# Patient Record
Sex: MALE | Race: WHITE | Employment: OTHER | URBAN - METROPOLITAN AREA
[De-identification: names, ages, dates, MRNs, and addresses within clinical notes are randomized per-mention and may not be internally consistent; named-entity substitution may affect disease eponyms.]

---

## 2022-06-15 ENCOUNTER — OFFICE VISIT (OUTPATIENT)
Dept: NEUROLOGY | Age: 62
End: 2022-06-15
Payer: COMMERCIAL

## 2022-06-15 VITALS
WEIGHT: 226.8 LBS | HEART RATE: 68 BPM | BODY MASS INDEX: 35.52 KG/M2 | SYSTOLIC BLOOD PRESSURE: 145 MMHG | DIASTOLIC BLOOD PRESSURE: 91 MMHG

## 2022-06-15 DIAGNOSIS — G20 PARKINSON'S DISEASE (HCC): Primary | ICD-10-CM

## 2022-06-15 DIAGNOSIS — G20 DEMENTIA DUE TO PARKINSON'S DISEASE WITHOUT BEHAVIORAL DISTURBANCE (HCC): ICD-10-CM

## 2022-06-15 DIAGNOSIS — F02.80 DEMENTIA DUE TO PARKINSON'S DISEASE WITHOUT BEHAVIORAL DISTURBANCE (HCC): ICD-10-CM

## 2022-06-15 DIAGNOSIS — R26.9 GAIT DISTURBANCE: ICD-10-CM

## 2022-06-15 DIAGNOSIS — G20 PSYCHOSIS DUE TO PARKINSON'S DISEASE (HCC): ICD-10-CM

## 2022-06-15 DIAGNOSIS — F41.9 ANXIETY: ICD-10-CM

## 2022-06-15 PROCEDURE — 99205 OFFICE O/P NEW HI 60 MIN: CPT | Performed by: PSYCHIATRY & NEUROLOGY

## 2022-06-15 RX ORDER — SERTRALINE HYDROCHLORIDE 25 MG/1
25 TABLET, FILM COATED ORAL DAILY
Qty: 30 TABLET | Refills: 5 | Status: SHIPPED | OUTPATIENT
Start: 2022-06-15 | End: 2022-08-15 | Stop reason: SDUPTHER

## 2022-06-15 NOTE — PROGRESS NOTES
[facility] [facility]   [address], [address]  Phone: [phone] Fax [phone]  Kaylin Murphy MD      Patient: Jonelle Umana  Provider: Kaylin Murphy MD    CC:   Chief Complaint   Patient presents with    Follow-up     Parkinson's    Medication Refill     Referring Provider:    History of Present Illness:     Jonelle Umana is a 59 y.o. RH male who presents for follow-up of Parkinson's disease. He is accompanied for today's visit. Patient presents for follow-up and continued management of Parkinson's disease. He has previously been followed by Dr. Carolin Dawn over the last year and last seen March 2022. These notes have been reviewed. Chart review indicates patient was formally diagnosed in 2021 after approximately 2 years of symptoms including tremor of his L>R hand. Interestingly he has a family history of PD in his father (at the age of 50). Patient was subsequently started on levodopa and has slowly increased his dose over the last year to his current dose as noted below. He was subsequently referred to [address] for further evaluation and possible DBS candidacy. These notes have also been reviewed. Chart review indicates he saw Dr. Donalee Cranker for neuropsychological testing in May. Neuropsychiatric symptoms include cognitive decline, visual hallucinations, pseudobulbar affect, and RBD. Testing revealed marked impairment in multiple domains, most notably visual-spatial and executive function. Mild neurocognitive disorder was felt to be the most appropriate diagnosis at the time. However, clinical history with early onset of cognitive and psychiatric disturbances have raise concerns for other parkinsonian syndrome such as DLB.       It should also be noted that patient has a strong family history of Parkinson's disease most notably in his grandfather, father, and paternal aunt (though not formally diagnosed) with genetic testing and his daughter revealing a GBA mutation. Testing was reportedly done for further work-up of her son (patient's grandson) who has a mitochondrial disorder. 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 He has begun working reduced hours from 9 AM to 3 PM.  Illusions and visual hallucinations have been noted in addition to vivid dreams and behaviors at night resembling RBD. From a motor standpoint, he has done relatively well. Continues to take Sinemet  mg 2 tablets 4 times a day spaced approximately every 4 hours. OFF symptoms include a feeling of heaviness in his legs, gait disturbance including worsening shuffling, and tremors primarily of his left hand. He does not endorse anything resembling short duration responses or motor fluctuations throughout the day however. No dyskinesias. He does report starting physical therapy in [place] and also has participated in [employer] [place] boxing classes. He was recently trialed on donepezil but ended up discontinuing it due to adverse effects. Patient endorses a significant amount of anxiety to me which is his most pressing complaint today. Review of Systems:   Review of Systems   Constitutional: Negative for fever. HENT: Negative for congestion. Eyes: Negative for visual disturbance. Respiratory: Negative for cough. Cardiovascular: Negative for chest pain. Gastrointestinal: Negative for constipation. Genitourinary: Negative for dysuria. Musculoskeletal: Negative for gait problem. Skin: Negative for rash. Allergic/Immunologic: Negative for immunocompromised state. Neurological: Positive for tremors and weakness. Psychiatric/Behavioral: Positive for confusion (memory loss), decreased concentration, hallucinations and sleep disturbance. The patient is nervous/anxious.         Lab/Imaging Review:   I REVIEWED PERTINENT LABS, IMAGES, AND REPORTS WITH THE PATIENT PERSONALLY, DIRECTLY AND FULLY. THE MOST PERTINENT FINDINGS ARE NOTED BELOW:    DaTscan May 2022:  Impression: Abnormal exam, tracer significantly decreased at both striatum-most prominent posteriorly at the putamen, and also significantly decreased on the RIGHT side at the caudate head. This scan appearance supports a diagnosis of parkinsonian syndrome. Past Medical History:     Past medical history, surgical history, social history, family history, medications, and allergies were reviewed and updated as appropriate. PAST MEDICAL HISTORY:  Past Medical History:   Diagnosis Date    Arthritis     Mild neurocognitive disorder     Parkinson's disease ([facility]      PAST SURGICAL HISTORY:   Past Surgical History:   Procedure Laterality Date    VASECTOMY      WISDOM TOOTH EXTRACTION       FAMILY HISTORY:  Family History   Problem Relation Age of Onset    Parkinson's Disease Father     Parkinson's Disease Paternal Grandfather       SOCIAL HISTORY:  Social History     Socioeconomic History    Marital status:      Spouse name: None    Number of children: None    Years of education: None    Highest education level: None   Occupational History    None   Tobacco Use    Smoking status: Never Smoker    Smokeless tobacco: Never Used   Substance and Sexual Activity    Alcohol use: None    Drug use: None    Sexual activity: None   Other Topics Concern    None   Social History Narrative    None     Social Determinants of Health     Financial Resource Strain:     Difficulty of Paying Living Expenses: Not on file   Food Insecurity:     Worried About Running Out of Food in the Last Year: Not on file    Keke of Food in the Last Year: Not on file   Transportation Needs:     Lack of Transportation (Medical): Not on file    Lack of Transportation (Non-Medical):  Not on file   Physical Activity:     Days of Exercise per Week: Not on file    Minutes of Exercise per Session: Not on file   Stress:     Feeling of Stress : Not on file   Social Connections:     Frequency of Communication with Friends and Family: Not on file    Frequency of Social Gatherings with Friends and Family: Not on file    Attends [employer] Services: Not on file    Active Member of Clubs or Organizations: Not on file    Attends Club or Organization Meetings: Not on file    Marital Status: Not on file   Intimate Partner Violence:     Fear of Current or Ex-Partner: Not on file    Emotionally Abused: Not on file    Physically Abused: Not on file    Sexually Abused: Not on file   Housing Stability:     Unable to Pay for Housing in the Last Year: Not on file    Number of [place] in the Last Year: Not on file    Unstable Housing in the Last Year: Not on file       Medications/Allergies:     MEDICATIONS:   Outpatient Encounter Medications as of 6/15/2022   Medication Sig Dispense Refill    sertraline (ZOLOFT) 25 MG tablet Take 1 tablet by mouth daily 30 tablet 5    aspirin 81 MG EC tablet Take 81 mg by mouth daily      carbidopa-levodopa (SINEMET)  MG per tablet Take 2 tablets by mouth 4 times daily       meloxicam (MOBIC) 7.5 MG tablet Take 7.5 mg by mouth daily      [DISCONTINUED] Istradefylline (NOURIANZ) 20 MG TABS Take 20 mg by mouth daily (Patient not taking: Reported on 6/15/2022)       No facility-administered encounter medications on file as of 6/15/2022. ALLERGIES:  No Known Allergies      Physical Exam:     BP (!) 145/91 (Site: Right Upper Arm, Position: Sitting)   Pulse 68   Wt 226 lb 12.8 oz (102.9 kg)   BMI 35.52 kg/m²     General Exam:  General: Well developed, well nourished, in no apparent distress. HEENT: Normocephalic, atraumatic. Sclera anicteric. Oropharynx clear. Neck: Supple without masses  Cardiovascular: Regular rate and rhythm. No carotid bruits. Lungs: Non-labored breathing. Abdomen: Soft, nontender, nondistended. Extremities: Peripheral pulses intact.  No edema and no rashes. Neurological Exam:     MS/Language/Speech:  Alert. Oriented to person, place, and time. Language fluent. Mild hypophonia. Cranial Nerves: PERRL. Eye movements full. No nystagmus. There is diminished facial expression. Tongue and palate were midline. Shoulder shrug with slight delay on the left. Motor: Strength was full in all proximal and distal muscle groups. There is mild to moderate rigidity in the left upper and left lower extremity. Only slight rigidity in the right upper and right lower extremity. Abnormal Movements: Intermittent L>R resting tremor noted with only a slight kinetic component. Mild to moderate bradykinesia with upper extremity movements, slightly worse on the left in some respects. Foot tapping is mildly slowed bilaterally with no clear asymmetry. Sensory: Normal to light touch, pinprick, and vibration throughout. Cerebellar: No ataxia or dysmetria. Reflexes (R/L): Biceps (2+/2+), Brachioradialis (2+/2+), Patellar (2+/2+). Plantar responses were flexor. Gait: He can rise from a seated position without assistance. Posture is mildly stooped. Gait is narrow based with reduced arm swing bilaterally. Stride is mildly reduced bilaterally. Turns are relatively fluid. No freezing noted. Postural reflexes are relatively intact. Assessment and Plan:     Jacque John is a 59 y.o. male who presents with the following issues:     Bao Temple was seen today for follow-up and medication refill. Diagnoses and all orders for this visit:    Parkinson's disease ([address]    Dementia due to Parkinson's disease without behavioral disturbance ([address]    Psychosis due to Parkinson's disease ([address]    Anxiety  -     sertraline (ZOLOFT) 25 MG tablet; Take 1 tablet by mouth daily    Gait disturbance      In summary, this gentleman has clear features of parkinsonism on exam with an abnormal DaTscan consistent with a primary parkinsonian syndrome.   His presentation could certainly be consistent with PD though with the presence of early cognitive dysfunction and neuropsychiatric symptoms, this could raise concern for DLB. Motor symptoms appear to be manageable though he does have some tremor and mild gait dysfunction. It is never been very clear if he has had a beneficial response to levodopa. I spent an extensive amount of time reviewing records and discussing his symptoms with him today. With respect to his DBS candidacy, I do not feel strongly that he would benefit from DBS surgery. He was very clear that the primary source of his disruption in day-to-day life are his cognitive dysfunction and anxiety and I did discuss that DBS is not likely to help with these issues (and in the case of cognitive dysfunction can sometimes worsen). As noted above, the symptom complex would raise concern for the possibility of DLB. Genetic testing for GBA mutations is apparently pending. Trials of donepezil were poorly tolerated. We ultimately settled on a trial of sertraline 25 mg daily as he notes anxiety was his primary complaint today. Dosage increases could be considered as needed and tolerated. But in the future we could consider other cholinesterase inhibitor such as an Exelon patch to avoid GI adverse effects. I also recommended a trial of melatonin for RBD which has been problematic at night and causing significant sleep disruption. He was counseled on the benefits of aerobic exercise with respect to delaying disease progression and I encouraged his participation in physical therapy which is already been set up for him. Follow-up in 2 months.       Signature: Dafne Rangel MD      Date:  6/21/2022    [facility]   [facility] [address] [place] [address]  Ph: [phone]  Fax: [phone]         I have personally interviewed and examined Mr. Angela Wells and I have personally reviewed all relevant records including labs and imaging as noted above. I have written all aspects of this note. More than 50% of this time was used for counseling regarding my diagnosis, prognosis, and plans for management. Total visit time: 70 minutes.

## 2022-06-21 ASSESSMENT — ENCOUNTER SYMPTOMS
CONSTIPATION: 0
COUGH: 0

## 2022-08-08 DIAGNOSIS — G20 PARKINSON'S DISEASE (HCC): Primary | ICD-10-CM

## 2022-08-15 ENCOUNTER — OFFICE VISIT (OUTPATIENT)
Dept: NEUROLOGY | Age: 62
End: 2022-08-15
Payer: COMMERCIAL

## 2022-08-15 VITALS
HEIGHT: 67 IN | BODY MASS INDEX: 34.84 KG/M2 | WEIGHT: 222 LBS | SYSTOLIC BLOOD PRESSURE: 150 MMHG | HEART RATE: 66 BPM | DIASTOLIC BLOOD PRESSURE: 86 MMHG

## 2022-08-15 DIAGNOSIS — G20 PARKINSON'S DISEASE (HCC): Primary | ICD-10-CM

## 2022-08-15 DIAGNOSIS — F02.80 DEMENTIA DUE TO PARKINSON'S DISEASE WITHOUT BEHAVIORAL DISTURBANCE (HCC): ICD-10-CM

## 2022-08-15 DIAGNOSIS — G20 DEMENTIA DUE TO PARKINSON'S DISEASE WITHOUT BEHAVIORAL DISTURBANCE (HCC): ICD-10-CM

## 2022-08-15 DIAGNOSIS — F41.9 ANXIETY: ICD-10-CM

## 2022-08-15 DIAGNOSIS — G47.52 RBD (REM BEHAVIORAL DISORDER): ICD-10-CM

## 2022-08-15 DIAGNOSIS — G20 PSYCHOSIS DUE TO PARKINSON'S DISEASE (HCC): ICD-10-CM

## 2022-08-15 DIAGNOSIS — R26.89 ABNORMALITY OF GAIT DUE TO IMPAIRMENT OF BALANCE: ICD-10-CM

## 2022-08-15 DIAGNOSIS — G89.29 OTHER CHRONIC PAIN: ICD-10-CM

## 2022-08-15 PROCEDURE — 99215 OFFICE O/P EST HI 40 MIN: CPT | Performed by: PSYCHIATRY & NEUROLOGY

## 2022-08-15 RX ORDER — RIVASTIGMINE 4.6 MG/24H
1 PATCH, EXTENDED RELEASE TRANSDERMAL DAILY
Qty: 30 PATCH | Refills: 5 | Status: SHIPPED | OUTPATIENT
Start: 2022-08-15 | End: 2022-10-25 | Stop reason: ALTCHOICE

## 2022-08-15 RX ORDER — SERTRALINE HYDROCHLORIDE 25 MG/1
25 TABLET, FILM COATED ORAL DAILY
Qty: 90 TABLET | Refills: 3 | Status: SHIPPED | OUTPATIENT
Start: 2022-08-15 | End: 2022-10-25 | Stop reason: SDUPTHER

## 2022-08-15 RX ORDER — MELOXICAM 7.5 MG/1
7.5 TABLET ORAL DAILY
Qty: 30 TABLET | Refills: 11 | Status: SHIPPED | OUTPATIENT
Start: 2022-08-15

## 2022-08-15 RX ORDER — CARBIDOPA/LEVODOPA 25MG-250MG
1 TABLET ORAL 4 TIMES DAILY
Qty: 120 TABLET | Refills: 5 | Status: SHIPPED | OUTPATIENT
Start: 2022-08-15

## 2022-08-15 ASSESSMENT — ENCOUNTER SYMPTOMS
CONSTIPATION: 0
COUGH: 0

## 2022-08-15 NOTE — PROGRESS NOTES
[address]  [address], [address] [facility], [address] [place] [address]  Phone: [phone] Fax [phone]  Pamela Lee MD      Patient: Estefania Osman  Provider: Pamela Lee MD    CC:   Chief Complaint   Patient presents with    Neurologic Problem     2 month follow up Parkinson's     Referring Provider:    History of Present Illness:     Estefania Osman is a 59 y.o. RH male who presents for follow-up of Parkinson's disease. He is accompanied for today's visit. He was last seen June 2022. Patient presents for follow-up and continued management of Parkinson's disease. He has previously been followed by Dr. Patricia Arboleda over the last year and last seen March 2022. These notes have been reviewed. To summarize, he was formally diagnosed with PD in 2021 after approximately 2 years of symptoms including tremor of his L>R hand. Interestingly he has a family history of PD in his father (at the age of 50 years). He was subsequently started on levodopa with a gradual increase in dose over the last year. He was also referred to [address] for further evaluation possible DBS candidacy though he has not been felt to be an adequate DBS candidate based off the presence of early cognitive decline and neuropsychiatric symptoms. Patient be noted that patient has a strong family history of Parkinson's disease with genetic testing revealing a GBA mutation (unable to review actual results). Current medications include:  Sinemet  mg 2 tablets 4 times a day approximately every 4 hours  Sertraline 25 mg daily    Previous medication trials include: Donepezil (adverse effects), Rytary (high cost)      Patient presents for follow-up. He has continued some follow-up with [address] and it appears he is not felt to be an adequate candidate for DBS.   Much of the issue may involve relatively early presence of cognitive decline and visual hallucinations that have led to concern for other possible parkinsonian syndrome such as DLB.    It does appear that he gets benefit from levodopa and he continues to take it 4 times a day as noted above. 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 He has begun working reduced hours from 9 AM to 3 PM.  He also notes a general heaviness of his body and legs and becomes greatly fatigued later in the day. It is not clear if this is a levodopa responsive symptom or not as he does not endorse anything resembling early wearing off or motor fluctuations. No dyskinesias. Delusions and visual hallucinations have been noted but appear to be relatively stable. He does have vivid dreams and behaviors at night resembling RBD though fortunately these are not causing significant disruptions in sleep. Previous trials of donepezil were poorly tolerated. The addition of sertraline has been helpful for anxiety. He does report starting physical therapy in [place] and also has participated in [place] boxing classes. Review of Systems:   Review of Systems   Constitutional:  Negative for fever. HENT:  Negative for congestion. Eyes:  Negative for visual disturbance. Respiratory:  Negative for cough. Cardiovascular:  Negative for chest pain. Gastrointestinal:  Negative for constipation. Genitourinary:  Negative for dysuria. Musculoskeletal:  Negative for gait problem. Skin:  Negative for rash. Allergic/Immunologic: Negative for immunocompromised state. Neurological:  Positive for tremors and weakness. Psychiatric/Behavioral:  Positive for confusion (memory loss), decreased concentration, hallucinations and sleep disturbance. The patient is nervous/anxious. Lab/Imaging Review:   I REVIEWED PERTINENT LABS, IMAGES, AND REPORTS WITH THE PATIENT PERSONALLY, DIRECTLY AND FULLY.  THE MOST PERTINENT FINDINGS ARE NOTED BELOW:    DaTscan May 2022:  Impression: Abnormal exam, tracer significantly decreased at both striatum-most prominent posteriorly at the putamen, and also significantly decreased on the RIGHT side at the caudate head. This scan appearance supports a diagnosis of parkinsonian syndrome. Past Medical History:     Past medical history, surgical history, social history, family history, medications, and allergies were reviewed and updated as appropriate. PAST MEDICAL HISTORY:  Past Medical History:   Diagnosis Date    Arthritis     Mild neurocognitive disorder     Parkinson's disease ([address]      PAST SURGICAL HISTORY:   Past Surgical History:   Procedure Laterality Date    VASECTOMY      WISDOM TOOTH EXTRACTION       FAMILY HISTORY:  Family History   Problem Relation Age of Onset    Parkinson's Disease Father     Parkinson's Disease Paternal Grandfather       SOCIAL HISTORY:  Social History     Socioeconomic History    Marital status:      Spouse name: None    Number of children: None    Years of education: None    Highest education level: None   Tobacco Use    Smoking status: Never    Smokeless tobacco: Never       Medications/Allergies:     MEDICATIONS:   Outpatient Encounter Medications as of 8/15/2022   Medication Sig Dispense Refill    meloxicam (MOBIC) 7.5 MG tablet Take 1 tablet by mouth in the morning. 30 tablet 11    sertraline (ZOLOFT) 25 MG tablet Take 1 tablet by mouth in the morning. 90 tablet 3    rivastigmine (EXELON) 4.6 MG/24HR Place 1 patch onto the skin in the morning. 30 patch 5    carbidopa-levodopa (SINEMET)  MG per tablet Take 1 tablet by mouth in the morning and 1 tablet at noon and 1 tablet in the evening and 1 tablet before bedtime. 120 tablet 5    aspirin 81 MG EC tablet Take 81 mg by mouth daily      [DISCONTINUED] carbidopa-levodopa (SINEMET)  MG per tablet Take 2 tablets by mouth in the morning and 2 tablets at noon and 2 tablets in the evening and 2 tablets before bedtime.  720 tablet 1    [DISCONTINUED] sertraline (ZOLOFT) 25 MG tablet Take 1 tablet by mouth daily 30 tablet 5    [DISCONTINUED] meloxicam (MOBIC) 7.5 MG tablet Take 7.5 mg by mouth daily       No facility-administered encounter medications on file as of 8/15/2022. ALLERGIES:  No Known Allergies      Physical Exam:     BP (!) 150/86   Pulse 66   Ht 5' 7\" (1.702 m)   Wt 222 lb (100.7 kg)   BMI 34.77 kg/m²     General Exam:  General: Well developed, well nourished, in no apparent distress. HEENT: Normocephalic, atraumatic. Sclera anicteric. Oropharynx clear. Neck: Supple without masses  Cardiovascular: Regular rate and rhythm. No carotid bruits. Lungs: Non-labored breathing. Abdomen: Soft, nontender, nondistended. Extremities: Peripheral pulses intact. No edema and no rashes. Neurological Exam:     MS/Language/Speech:  Alert. Oriented to person, place, and time. Language fluent. Mild hypophonia. Cranial Nerves: PERRL. Eye movements full. No nystagmus. There is diminished facial expression. Tongue and palate were midline. Shoulder shrug with slight delay on the left. Motor: Strength was full in all proximal and distal muscle groups. There is moderate rigidity in the left upper and left lower extremity. Mild rigidity in the right upper and right lower extremity. Abnormal Movements: Intermittent L>R resting tremor noted with only a slight kinetic component. Mild to moderate bradykinesia with upper extremity movements, slightly worse on the left in some respects. Foot tapping is mildly slowed bilaterally with no clear asymmetry. Sensory: Normal to light touch, pinprick, and vibration throughout. Cerebellar: No ataxia or dysmetria. Reflexes (R/L): Biceps (2+/2+), Brachioradialis (2+/2+), Patellar (2+/2+). Plantar responses were flexor. Gait: He can rise from a seated position without assistance. Posture is mildly stooped. Gait is narrow based with reduced arm swing bilaterally.  Stride is mildly reduced bilaterally. Turns are relatively fluid. No freezing noted. Postural reflexes are relatively intact. Assessment and Plan:     Aaron Christie is a 59 y.o. male who presents with the following issues:     Will Vazquez was seen today for neurologic problem. Diagnoses and all orders for this visit:    Park[facility]'s disease ([facility]  -     carbidopa-levodopa (SINEMET)  MG per tablet; Take 1 tablet by mouth in the morning and 1 tablet at noon and 1 tablet in the evening and 1 tablet before bedtime. Dementia due to Parkinson's disease without behavioral disturbance (HCC)  -     rivastigmine (EXELON) 4.6 MG/24HR; Place 1 patch onto the skin in the morning. [facility]    Anxiety  -     sertraline (ZOLOFT) 25 MG tablet; Take 1 tablet by mouth in the morning. Other chronic pain  -     meloxicam (MOBIC) 7.5 MG tablet; Take 1 tablet by mouth in the morning. RBD (REM behavioral disorder)    Abnormality of gait due to impairment of balance      Patient presents for follow-up and continued management of Parkinson's disease complicated by PD-MCI/mild dementia in addition to early neuropsychiatric symptoms including visual hallucinations. This has led to concern for the possibility of DLB, though I have also been informed that genetic testing is also revealed possible GBA mutation (though unable to review results) that may be underlying a strong family history of PD. His primary complaint today appears to be worsening motor deficits including a feeling of fatigue and heaviness in his body and lower extremities that appears to worsen throughout the day. It is not entirely clear if this is a wearing off symptom but I have suggested that we can increase his levodopa to a Sinemet  mg tablet to be taken 1 tablet 4 times a day.     We have spent some time reviewing records and discussing symptoms with him with respect to his DBS candidacy and unfortunately it is felt that he has not an adequate candidate. Cognitive impairment continues to be an issue and we will try an Exelon patch 4.6 mg per 24 hours in an attempt to avoid the GI adverse effects that came up with donepezil. He will continue sertraline 25 mg daily which has been helpful for anxiety and dosage increases could be considered in the future if needed. We will continue to monitor for any worsening psychotic symptoms. I also recommended a trial of melatonin for RBD which has been problematic at night especially if they are causing more significant sleep disruption. He was counseled on the benefits of aerobic exercise with respect to delaying disease progression and I encouraged his participation in physical therapy which is already been set up for him. Follow-up in 4 months. Signature: Gia Dougherty MD      Date:  8/21/2022    [facility]   [place] [address], [address]  Ph: [phone]  Fax: [phone]         I have personally interviewed and examined Mr. Blayne Perez and I have personally reviewed all relevant records including labs and imaging as noted above. I have written all aspects of this note. More than 50% of this time was used for counseling regarding my diagnosis, prognosis, and plans for management. Total visit time: 46 minutes.

## 2022-10-25 ENCOUNTER — TELEPHONE (OUTPATIENT)
Dept: NEUROLOGY | Age: 62
End: 2022-10-25

## 2022-10-25 DIAGNOSIS — F41.9 ANXIETY: ICD-10-CM

## 2022-10-25 DIAGNOSIS — F02.80 DEMENTIA DUE TO PARKINSON'S DISEASE WITHOUT BEHAVIORAL DISTURBANCE (HCC): ICD-10-CM

## 2022-10-25 DIAGNOSIS — G20 DEMENTIA DUE TO PARKINSON'S DISEASE WITHOUT BEHAVIORAL DISTURBANCE (HCC): ICD-10-CM

## 2022-10-25 RX ORDER — RIVASTIGMINE 9.5 MG/24H
1 PATCH, EXTENDED RELEASE TRANSDERMAL DAILY
Qty: 30 PATCH | Refills: 5 | Status: SHIPPED | OUTPATIENT
Start: 2022-10-25

## 2022-10-25 NOTE — TELEPHONE ENCOUNTER
Spoke to spouse. We are going to increase sertraline to 50 mg daily. Increase Exelon to 9.5 mg / 24-hour patch.

## 2022-10-25 NOTE — TELEPHONE ENCOUNTER
Pt's wife called and stated she feels the exelon and zoloft may need to be increased. The dose that was started was working, but pt has become on edge again with his anxiety. His memory issues seem to be getting a little worse also.

## 2022-12-30 ENCOUNTER — OFFICE VISIT (OUTPATIENT)
Dept: NEUROLOGY | Age: 62
End: 2022-12-30
Payer: COMMERCIAL

## 2022-12-30 VITALS
OXYGEN SATURATION: 97 % | DIASTOLIC BLOOD PRESSURE: 82 MMHG | HEART RATE: 78 BPM | SYSTOLIC BLOOD PRESSURE: 120 MMHG | WEIGHT: 214 LBS | BODY MASS INDEX: 33.52 KG/M2

## 2022-12-30 DIAGNOSIS — F41.9 ANXIETY: ICD-10-CM

## 2022-12-30 DIAGNOSIS — F02.80 DEMENTIA DUE TO PARKINSON'S DISEASE WITHOUT BEHAVIORAL DISTURBANCE (HCC): ICD-10-CM

## 2022-12-30 DIAGNOSIS — G47.52 RBD (REM BEHAVIORAL DISORDER): ICD-10-CM

## 2022-12-30 DIAGNOSIS — R26.89 ABNORMALITY OF GAIT DUE TO IMPAIRMENT OF BALANCE: ICD-10-CM

## 2022-12-30 DIAGNOSIS — G20 MODERATE DEMENTIA DUE TO PARKINSON'S DISEASE, WITHOUT BEHAVIORAL DISTURBANCE, PSYCHOTIC DISTURBANCE, MOOD DISTURBANCE, OR ANXIETY (HCC): ICD-10-CM

## 2022-12-30 DIAGNOSIS — F02.B0 MODERATE DEMENTIA DUE TO PARKINSON'S DISEASE, WITHOUT BEHAVIORAL DISTURBANCE, PSYCHOTIC DISTURBANCE, MOOD DISTURBANCE, OR ANXIETY (HCC): ICD-10-CM

## 2022-12-30 DIAGNOSIS — G89.29 OTHER CHRONIC PAIN: ICD-10-CM

## 2022-12-30 DIAGNOSIS — G20 PSYCHOSIS DUE TO PARKINSON'S DISEASE (HCC): ICD-10-CM

## 2022-12-30 DIAGNOSIS — G20 DEMENTIA DUE TO PARKINSON'S DISEASE WITHOUT BEHAVIORAL DISTURBANCE (HCC): ICD-10-CM

## 2022-12-30 DIAGNOSIS — G20 PARKINSON'S DISEASE (HCC): Primary | ICD-10-CM

## 2022-12-30 PROCEDURE — 99215 OFFICE O/P EST HI 40 MIN: CPT | Performed by: PSYCHIATRY & NEUROLOGY

## 2022-12-30 RX ORDER — MEMANTINE HYDROCHLORIDE 5 MG/1
5 TABLET ORAL 2 TIMES DAILY
Qty: 60 TABLET | Refills: 5 | Status: SHIPPED | OUTPATIENT
Start: 2022-12-30

## 2022-12-30 RX ORDER — SERTRALINE HYDROCHLORIDE 25 MG/1
25 TABLET, FILM COATED ORAL DAILY
Qty: 90 TABLET | Refills: 3 | Status: SHIPPED | OUTPATIENT
Start: 2022-12-30

## 2022-12-30 RX ORDER — MELOXICAM 15 MG/1
15 TABLET ORAL DAILY
Qty: 30 TABLET | Refills: 5 | Status: SHIPPED | OUTPATIENT
Start: 2022-12-30

## 2022-12-30 ASSESSMENT — ENCOUNTER SYMPTOMS
CONSTIPATION: 0
COUGH: 0

## 2022-12-30 NOTE — PROGRESS NOTES
Malachi Knutson  [address], [address] [facility], [address] [place] [address]  Phone: [phone] Fax [phone]  Deirdre Monahan MD      Patient: Nazia Bond  Provider: Deirdre Monahan MD    CC:   Chief Complaint   Patient presents with    Follow-up     Parkinson's Disease     Referring Provider:    History of Present Illness:     Nazia Bond is a 57 y.o. RH male who presents for follow-up of Parkinson's disease. He is accompanied for today's visit. He was last seen June 2022. Patient presents for follow-up and continued management of Parkinson's disease. He has previously been followed by Dr. Ele Tavera over the last year and last seen March 2022. These notes have been reviewed. To summarize, he was formally diagnosed with PD in 2021 after approximately 2 years of symptoms including tremor of his L>R hand. Interestingly he has a family history of PD in his father (at the age of 50 years). He was subsequently started on levodopa with a gradual increase in dose over the last year. He was also referred to [address] for further evaluation possible DBS candidacy though he has not been felt to be an adequate DBS candidate based off the presence of early cognitive decline and neuropsychiatric symptoms. Patient be noted that patient has a strong family history of Parkinson's disease with genetic testing revealing a GBA mutation (unable to review actual results). Current medications include:  Sinemet  mg 1 tablets 4 times a day approximately every 4 hours  Sertraline 25 mg daily (reports adverse effects with 50 mg)    Previous medication trials include: Donepezil (adverse effects), Rytary (high cost)    Patient presents for follow-up. Continues to take Sinemet 4 times a day as noted above. We also had increased his sertraline but he reduced the dose back down to 25 mg daily due to reported adverse effects.   It is not clear if the side effects were from that were from the Exelon patch that was attempted. He has had some difficulty with cholinesterase inhibitors in general with respect adverse effects. 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 He has difficulty remembering how to do certain tasks, for example when working on cars, which he had previously been doing for decades. His spouse is also at work with him and helps to  work. He has more difficulty with arithmetic. He still notes a general heaviness of his body and legs and becomes greatly fatigued later in the day. It is not clear if this is a levodopa responsive symptom or not as he does not endorse anything resembling early wearing off or motor fluctuations. No dyskinesias. Delusions and visual hallucinations have been noted but appear to be relatively stable. He does have vivid dreams and behaviors at night resembling RBD though fortunately these are not causing significant disruptions in sleep. He does take over-the-counter melatonin which appears to have been helpful. Previous trials of donepezil and now Exelon patch were poorly tolerated. The addition of sertraline has been helpful for anxiety but he did not like the increased dose. There have been some slight dyskinetic movements in the lower extremity which have not been particularly noticeable or bothersome. He had started physical therapy in [place] and is interested in a refresher. He has also done boxing classes. Review of Systems:   Review of Systems   Constitutional:  Negative for fever. HENT:  Negative for congestion. Eyes:  Negative for visual disturbance. Respiratory:  Negative for cough. Cardiovascular:  Negative for chest pain. Gastrointestinal:  Negative for constipation. Genitourinary:  Negative for dysuria. Musculoskeletal:  Negative for gait problem.    Skin: Negative for rash. Allergic/Immunologic: Negative for immunocompromised state. Neurological:  Positive for tremors and weakness. Psychiatric/Behavioral:  Positive for confusion (memory loss), decreased concentration, hallucinations and sleep disturbance. The patient is nervous/anxious. Lab/Imaging Review:   I REVIEWED PERTINENT LABS, IMAGES, AND REPORTS WITH THE PATIENT PERSONALLY, DIRECTLY AND FULLY. THE MOST PERTINENT FINDINGS ARE NOTED BELOW:    DaTscan May 2022:  Impression: Abnormal exam, tracer significantly decreased at both striatum-most prominent posteriorly at the putamen, and also significantly decreased on the RIGHT side at the caudate head. This scan appearance supports a diagnosis of parkinsonian syndrome. Past Medical History:     Past medical history, surgical history, social history, family history, medications, and allergies were reviewed and updated as appropriate.      PAST MEDICAL HISTORY:  Past Medical History:   Diagnosis Date    Arthritis     Mild neurocognitive disorder     Parkinson's disease ([facility] [address]      PAST SURGICAL HISTORY:   Past Surgical History:   Procedure Laterality Date    VASECTOMY      WISDOM TOOTH EXTRACTION       FAMILY HISTORY:  Family History   Problem Relation Age of Onset    Parkinson's Disease Father     Parkinson's Disease Paternal Grandfather       SOCIAL HISTORY:  Social History     Socioeconomic History    Marital status:      Spouse name: None    Number of children: None    Years of education: None    Highest education level: None   Tobacco Use    Smoking status: Never    Smokeless tobacco: Never       Medications/Allergies:     MEDICATIONS:   Outpatient Encounter Medications as of 12/30/2022   Medication Sig Dispense Refill    memantine (NAMENDA) 5 MG tablet Take 1 tablet by mouth 2 times daily 60 tablet 5    meloxicam (MOBIC) 15 MG tablet Take 1 tablet by mouth daily 30 tablet 5    sertraline (ZOLOFT) 25 MG tablet Take 1 tablet by mouth daily 90 tablet 3    carbidopa-levodopa (SINEMET)  MG per tablet Take 1 tablet by mouth in the morning and 1 tablet at noon and 1 tablet in the evening and 1 tablet before bedtime. 120 tablet 5    aspirin 81 MG EC tablet Take 81 mg by mouth daily      [DISCONTINUED] rivastigmine (EXELON) 9.5 MG/24HR Place 1 patch onto the skin daily (Patient not taking: Reported on 12/30/2022) 30 patch 5    [DISCONTINUED] sertraline (ZOLOFT) 50 MG tablet Take 1 tablet by mouth daily 30 tablet 5    [DISCONTINUED] meloxicam (MOBIC) 7.5 MG tablet Take 1 tablet by mouth in the morning. 30 tablet 11     No facility-administered encounter medications on file as of 12/30/2022. ALLERGIES:  No Known Allergies      Physical Exam:     /82   Pulse 78   Wt 214 lb (97.1 kg)   SpO2 97%   BMI 33.52 kg/m²     General Exam:  General: Well developed, well nourished, in no apparent distress. HEENT: Normocephalic, atraumatic. Sclera anicteric. Oropharynx clear. Neck: Supple without masses  Cardiovascular: Regular rate and rhythm. No carotid bruits. Lungs: Non-labored breathing. Abdomen: Soft, nontender, nondistended. Extremities: Peripheral pulses intact. No edema and no rashes. Neurological Exam:     MS/Language/Speech:  Alert. Oriented to person, place, and time. He had difficulty drawing a clock with numbers and hands in the appropriate positions language fluent. He was not able to state the months backwards accurately. Mild hypophonia. Cranial Nerves: PERRL. Eye movements full. No nystagmus. There is diminished facial expression. Tongue and palate were midline. Shoulder shrug with slight delay on the left. Motor: Strength was full in all proximal and distal muscle groups. There is moderate rigidity in the left upper and left lower extremity. Mild rigidity in the right upper and right lower extremity. Abnormal Movements: Intermittent L>R resting tremor noted with only a slight kinetic component.   Mild to moderate bradykinesia with upper extremity movements, slightly worse on the left in some respects. Foot tapping is mildly slowed bilaterally with no clear asymmetry. Sensory: Normal to light touch, pinprick, and vibration throughout. Cerebellar: No ataxia or dysmetria. Reflexes (R/L): Biceps (2+/2+), Brachioradialis (2+/2+), Patellar (2+/2+). Plantar responses were flexor. Gait: He can rise from a seated position without assistance. Posture is mildly stooped. Gait is narrow based with reduced arm swing bilaterally. Stride is mildly reduced bilaterally. Turns are relatively fluid. No freezing noted. Postural reflexes are relatively intact. Assessment and Plan:     Kylie Navarrete is a 57 y.o. male who presents with the following issues:     Nayana Oneill was seen today for follow-up. Diagnoses and all orders for this visit:    Parkinson's disease ([facility] [address]  -     External Referral to Physical Therapy    Moderate dementia due to Parkinson's disease, without behavioral disturbance, psychotic disturbance, mood disturbance, or anxiety ([facility])  -     memantine (NAMENDA) 5 MG tablet; Take 1 tablet by mouth 2 times daily    Abnormality of gait due to impairment of balance  -     External Referral to Physical Therapy    Other chronic pain  -     meloxicam (MOBIC) 15 MG tablet; Take 1 tablet by mouth daily    Anxiety  -     sertraline (ZOLOFT) 25 MG tablet; Take 1 tablet by mouth daily    Dementia due to Parkinson's disease without behavioral disturbance ([facility] [address]    Psychosis due to Parkinson's disease ([facility]    RBD (REM behavioral disorder)      Patient presents for follow-up and continued management of Parkinson's disease complicated by PD-MCI/mild dementia in addition to early neuropsychiatric symptoms including visual hallucinations.   This has led to concern for the possibility of DLB, though I have also been informed that genetic testing is also revealed possible GBA mutation (though unable to review results) that may be underlying a strong family history of PD. His primary complaint today continues to be worsening motor deficits including a feeling of fatigue and heaviness in his body and lower extremities that appears to worsen throughout the day. It is not entirely clear if this is a wearing off symptom and increases to levodopa have not resulted in any additional benefit. He also is starting to obtain some levodopa induced dyskinesias in the lower extremities so we will need to be hesitant about any further dosage increases. I would like to have him participate in a refresher of physical therapy for his gait and mobility impairment. He has been counseled on the benefits of aerobic exercise with respect to delaying disease progression and maintaining functional mobility. We have spent some time reviewing records and discussing symptoms with him with respect to his DBS candidacy and unfortunately it is felt that he has not an adequate candidate, primarily due to the significant cognitive or neuropsychiatric issues. Start memantine 5 mg twice a day for PD dementia. Trials of donepezil and Exelon patch were poorly tolerated. He will continue sertraline 25 mg daily which has been helpful for anxiety and dosage increases could be considered in the future if needed (he reported adverse effects but feel that this was more likely related to the Exelon). We will continue to monitor for any worsening psychotic symptoms. He will continue melatonin for RBD which has been problematic at night especially if they are causing more significant sleep disruption. I increased his meloxicam to 15 mg daily for chronic orthopedic pain. Follow-up in 4 months.       Signature: Kang Navarro MD      Date:  1/2/2023    [facility]   [address] , [address]  Ph: [phone]  Fax: [phone]         I have personally interviewed and examined Mr. Grace Sy and I have personally reviewed all relevant records including labs and imaging as noted above. I have written all aspects of this note. More than 50% of this time was used for counseling regarding my diagnosis, prognosis, and plans for management. Total visit time: 48 minutes.

## 2023-04-17 DIAGNOSIS — G20 PARKINSON'S DISEASE (HCC): ICD-10-CM

## 2023-04-17 RX ORDER — CARBIDOPA/LEVODOPA 25MG-250MG
1 TABLET ORAL 4 TIMES DAILY
Qty: 360 TABLET | Refills: 3 | Status: SHIPPED | OUTPATIENT
Start: 2023-04-17

## 2023-05-09 ENCOUNTER — OFFICE VISIT (OUTPATIENT)
Dept: NEUROLOGY | Age: 63
End: 2023-05-09
Payer: COMMERCIAL

## 2023-05-09 VITALS
WEIGHT: 212 LBS | HEART RATE: 70 BPM | OXYGEN SATURATION: 98 % | DIASTOLIC BLOOD PRESSURE: 85 MMHG | BODY MASS INDEX: 33.2 KG/M2 | SYSTOLIC BLOOD PRESSURE: 128 MMHG

## 2023-05-09 DIAGNOSIS — G20 PSYCHOSIS DUE TO PARKINSON'S DISEASE (HCC): ICD-10-CM

## 2023-05-09 DIAGNOSIS — G20 PARKINSON'S DISEASE (HCC): Primary | ICD-10-CM

## 2023-05-09 DIAGNOSIS — F41.9 ANXIETY: ICD-10-CM

## 2023-05-09 DIAGNOSIS — G20 MODERATE DEMENTIA DUE TO PARKINSON'S DISEASE, WITHOUT BEHAVIORAL DISTURBANCE, PSYCHOTIC DISTURBANCE, MOOD DISTURBANCE, OR ANXIETY (HCC): ICD-10-CM

## 2023-05-09 DIAGNOSIS — T42.8X5A LEVODOPA-INDUCED DYSKINESIA: ICD-10-CM

## 2023-05-09 DIAGNOSIS — R26.89 ABNORMALITY OF GAIT DUE TO IMPAIRMENT OF BALANCE: ICD-10-CM

## 2023-05-09 DIAGNOSIS — G47.52 RBD (REM BEHAVIORAL DISORDER): ICD-10-CM

## 2023-05-09 DIAGNOSIS — G24.01 LEVODOPA-INDUCED DYSKINESIA: ICD-10-CM

## 2023-05-09 DIAGNOSIS — F02.B0 MODERATE DEMENTIA DUE TO PARKINSON'S DISEASE, WITHOUT BEHAVIORAL DISTURBANCE, PSYCHOTIC DISTURBANCE, MOOD DISTURBANCE, OR ANXIETY (HCC): ICD-10-CM

## 2023-05-09 PROCEDURE — 99215 OFFICE O/P EST HI 40 MIN: CPT | Performed by: PSYCHIATRY & NEUROLOGY

## 2023-05-09 RX ORDER — FLUTICASONE FUROATE, UMECLIDINIUM BROMIDE AND VILANTEROL TRIFENATATE 200; 62.5; 25 UG/1; UG/1; UG/1
POWDER RESPIRATORY (INHALATION)
COMMUNITY
Start: 2023-04-22

## 2023-05-09 RX ORDER — LEVOFLOXACIN 500 MG/1
TABLET, FILM COATED ORAL
COMMUNITY

## 2023-05-09 RX ORDER — ALBUTEROL SULFATE 90 UG/1
AEROSOL, METERED RESPIRATORY (INHALATION)
COMMUNITY
Start: 2023-04-19

## 2023-05-09 ASSESSMENT — ENCOUNTER SYMPTOMS
CONSTIPATION: 0
COUGH: 0

## 2023-05-09 NOTE — PATIENT INSTRUCTIONS
Start Gocvori 137 mg capsules. Some samples were provided. And the specialty pharmacy should be reaching out to you to continue it (usually mailed). Week 1: Take 1 capsule at night. Week 2: Take 2 capsules at night. Increase memantine to 10 mg tablets. Take 1 tablet twice a day. This is for memory. Continue your carbidopa levodopa as you are currently doing. Referral to physical therapy was provided.

## 2023-05-09 NOTE — PROGRESS NOTES
Karen Reeder  [address], [address] [facility], [address] [place] [address]  Phone: [phone] Fax [phone]  Maryuri Ruiz MD      Patient: Jose Kelly  Provider: Maryuri Ruiz MD    CC:   Chief Complaint   Patient presents with    Follow-up     Parkinson's     Referring Provider:    History of Present Illness:     Jose Kelly is a 57 y.o. RH male who presents for follow-up of Parkinson's disease. He is accompanied for today's visit. He was last seen December 2022. Patient presents for follow-up and continued management of Parkinson's disease. He has previously been followed by Dr. Beatris De over the last year and last seen March 2022. These notes have been reviewed. To summarize, he was formally diagnosed with PD in 2021 after approximately 2 years of symptoms including tremor of his L>R hand. Interestingly he has a family history of PD in his father (at the age of 50 years). He was subsequently started on levodopa with a gradual increase in dose over the last year. He was also referred to [address] for further evaluation possible DBS candidacy though he has not been felt to be an adequate DBS candidate based off the presence of early cognitive decline and neuropsychiatric symptoms. Patient be noted that patient has a strong family history of Parkinson's disease with genetic testing revealing a GBA mutation (unable to review actual results). Current medications include:  Sinemet  mg 1 tablets 4 times a day approximately every 4 hours  Memantine 5 mg BID  Sertraline 25 mg daily (reports adverse effects with 50 mg)    Previous medication trials include: Donepezil (adverse effects), Rytary (high cost)      Patient presents today for follow-up. Overall things seem to be relatively stable. But he does continue to experience some wearing off in between doses of Sinemet which is being taken 4 times a day.   There has also been noted some dyskinetic movements which are described by him as

## 2023-05-10 RX ORDER — MEMANTINE HYDROCHLORIDE 10 MG/1
10 TABLET ORAL 2 TIMES DAILY
Qty: 60 TABLET | Refills: 5 | Status: SHIPPED | OUTPATIENT
Start: 2023-05-10

## 2023-05-10 RX ORDER — AMANTADINE 137 MG/1
137 CAPSULE, COATED PELLETS ORAL
Qty: 60 CAPSULE | Refills: 11
Start: 2023-05-10

## 2023-05-11 ENCOUNTER — TELEPHONE (OUTPATIENT)
Dept: NEUROLOGY | Age: 63
End: 2023-05-11

## 2023-05-11 NOTE — TELEPHONE ENCOUNTER
Per health plan's criteria, this drug is covered if you meet the following:  One of the following:  (1) All of the following:  (A) Your doctor provides medical records (document drug, duration, dose and date of use) showing that you have a history of using one additional covered (formulary) drug (if two or more are available) (if request is for a combination product, you must have documentation indicating concurrent use of separate drugs). Covered drugs include: generic amantadine, entacapone, and tolcapone. (B) Your doctor provides documentation stating the covered drug(s) has/have not been effective. (C) There are clinical reasons why the requested drug would work for your condition and the covered drug(s) would not (despite having the same active ingredient and/or same mechanism of action if applicable). The information provided does not show that you meet the criteria listed above.

## 2023-05-24 ENCOUNTER — APPOINTMENT (RX ONLY)
Dept: URBAN - NONMETROPOLITAN AREA CLINIC 1 | Facility: CLINIC | Age: 63
Setting detail: DERMATOLOGY
End: 2023-05-24

## 2023-05-24 DIAGNOSIS — R23.3 SPONTANEOUS ECCHYMOSES: ICD-10-CM | Status: INADEQUATELY CONTROLLED

## 2023-05-24 DIAGNOSIS — L82.1 OTHER SEBORRHEIC KERATOSIS: ICD-10-CM | Status: STABLE

## 2023-05-24 PROCEDURE — ? ORDER TESTS

## 2023-05-24 PROCEDURE — 99203 OFFICE O/P NEW LOW 30 MIN: CPT

## 2023-05-24 PROCEDURE — ? TREATMENT REGIMEN

## 2023-05-24 PROCEDURE — ? COUNSELING

## 2023-05-24 ASSESSMENT — LOCATION SIMPLE DESCRIPTION DERM
LOCATION SIMPLE: RIGHT FOREARM
LOCATION SIMPLE: LEFT FOREARM
LOCATION SIMPLE: RIGHT FOREHEAD
LOCATION SIMPLE: RIGHT UPPER BACK

## 2023-05-24 ASSESSMENT — LOCATION DETAILED DESCRIPTION DERM
LOCATION DETAILED: LEFT DISTAL DORSAL FOREARM
LOCATION DETAILED: RIGHT DISTAL RADIAL DORSAL FOREARM
LOCATION DETAILED: RIGHT MEDIAL UPPER BACK
LOCATION DETAILED: RIGHT MEDIAL FOREHEAD

## 2023-05-24 ASSESSMENT — LOCATION ZONE DERM
LOCATION ZONE: FACE
LOCATION ZONE: ARM
LOCATION ZONE: TRUNK

## 2023-05-24 NOTE — HPI: DISCOLORATION
How Severe Is Your Skin Discoloration?: mild
Additional History: Patient reports having discoloration on bilateral forearms. Patient has no other complaints or concerns

## 2023-05-24 NOTE — PROCEDURE: TREATMENT REGIMEN
Plan: Discussed with pt to see if any side affects of their medications are bruising & to f/u with pcp for further monitoring.
Detail Level: Detailed
Initiate Treatment: Recommended taking Vitamin C daily.\\nRecommended arnicare.

## 2023-07-20 DIAGNOSIS — G89.29 OTHER CHRONIC PAIN: ICD-10-CM

## 2023-07-21 RX ORDER — MELOXICAM 15 MG/1
15 TABLET ORAL DAILY
Qty: 30 TABLET | Refills: 5 | Status: SHIPPED | OUTPATIENT
Start: 2023-07-21

## 2023-08-09 ENCOUNTER — OFFICE VISIT (OUTPATIENT)
Dept: NEUROLOGY | Age: 63
End: 2023-08-09
Payer: COMMERCIAL

## 2023-08-09 VITALS
DIASTOLIC BLOOD PRESSURE: 82 MMHG | SYSTOLIC BLOOD PRESSURE: 136 MMHG | OXYGEN SATURATION: 98 % | WEIGHT: 214 LBS | HEART RATE: 94 BPM | BODY MASS INDEX: 33.52 KG/M2

## 2023-08-09 DIAGNOSIS — R49.8 HYPOPHONIA: ICD-10-CM

## 2023-08-09 DIAGNOSIS — R26.89 ABNORMALITY OF GAIT DUE TO IMPAIRMENT OF BALANCE: ICD-10-CM

## 2023-08-09 DIAGNOSIS — T42.8X5A MOTOR FLUCTUATIONS RELATED TO MEDICATION USE IN PARKINSON'S DISEASE (HCC): ICD-10-CM

## 2023-08-09 DIAGNOSIS — G20 PSYCHOSIS DUE TO PARKINSON'S DISEASE (HCC): ICD-10-CM

## 2023-08-09 DIAGNOSIS — F41.9 ANXIETY: ICD-10-CM

## 2023-08-09 DIAGNOSIS — R13.10 DYSPHAGIA, UNSPECIFIED TYPE: ICD-10-CM

## 2023-08-09 DIAGNOSIS — G47.52 RBD (REM BEHAVIORAL DISORDER): ICD-10-CM

## 2023-08-09 DIAGNOSIS — G20 MOTOR FLUCTUATIONS RELATED TO MEDICATION USE IN PARKINSON'S DISEASE (HCC): ICD-10-CM

## 2023-08-09 DIAGNOSIS — G20 PARKINSON DISEASE (HCC): Primary | ICD-10-CM

## 2023-08-09 PROCEDURE — 99215 OFFICE O/P EST HI 40 MIN: CPT

## 2023-08-09 PROCEDURE — 99417 PROLNG OP E/M EACH 15 MIN: CPT

## 2023-08-09 ASSESSMENT — ENCOUNTER SYMPTOMS
COUGH: 0
VOICE CHANGE: 1
CONSTIPATION: 0
TROUBLE SWALLOWING: 1

## 2023-08-23 ENCOUNTER — OFFICE VISIT (OUTPATIENT)
Age: 63
End: 2023-08-23
Payer: COMMERCIAL

## 2023-08-23 DIAGNOSIS — G20 PARKINSON DISEASE (HCC): Primary | ICD-10-CM

## 2023-08-23 DIAGNOSIS — R26.81 UNSTEADINESS ON FEET: ICD-10-CM

## 2023-08-23 DIAGNOSIS — R26.9 GAIT ABNORMALITY: ICD-10-CM

## 2023-08-23 DIAGNOSIS — Z74.09 IMPAIRED TRANSFERS: ICD-10-CM

## 2023-08-23 DIAGNOSIS — R29.3 ABNORMAL POSTURE: ICD-10-CM

## 2023-08-23 DIAGNOSIS — Z91.81 AT HIGH RISK FOR FALLS: ICD-10-CM

## 2023-08-23 PROCEDURE — 97161 PT EVAL LOW COMPLEX 20 MIN: CPT

## 2023-08-23 NOTE — PROGRESS NOTES
is having memory changes and tremors in the L hand/thumb. Balance is not bad but wife reports he shuffles his feet some. Says he use to get involuntary movements through his LLE however after his recent medication change this has improved. Has also noticed that when he sits in the afternoon he feels like ants are crawling on his legs. Denies any falls but says he may have had a couple of near falls. Pain Assessment:  No pain    Social/Functional Hx: How would you rate your overall health? good  Pt lives with independent spouse in a(n) 1 story house with  3-steps to enter with railing; shower/tub with no grab bar or bench . 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   Sleep:  wakes up ~3-4x/night to use bathroom and says he falls back to sleep easily; takes melatonin    CLOF PLOF   ADLs: indep with ADLs, has some difficulty donning suit jackets; says he has noticed he is having difficulty counting money over the last 1-2 years ADLs: --   Gait: reports shuffling gait Gait: --   Bed mobility/Transfer: reports difficulty getting out of bed; must use hands for sit>stand Bed Mobility/Transfers: --   Exercise: none Exercise: --   Occupation / Hobbies: Works full time in a [employer] shop(9:00-2:00 PM) and mostly oversees operation but does some alignments Occupation / Hobbies: --       Patient Stated Goals: Improve walking, balance and mobility    OBJECTIVE   HAND/SIDE DOMINANCE: right handed    SENSATION:    Light touch:  Intact   Proprioception:Intact      SKIN INTEGRITY: healing wounds on hands    NEUROMUSCULAR/MOTOR:  COORDINATION:    Right Left   Heel to shin test     Finger-to-nose test     Unanchored heel-toe test 9.5 10   LE Square tracing     Finger tapping (MDS-UPDRS)     Hand movements (MDS-UPDRS)  0 1   Toe tapping (MDS-UPDRS) 0 1; multiple hesitations   Leg agility (MDS-UPDRS)     Comments:        VISION:   Pursuits: appropriate   Saccades: appropriate  VOR:

## 2023-08-25 ENCOUNTER — CARE COORDINATION (OUTPATIENT)
Dept: CARE COORDINATION | Facility: CLINIC | Age: 63
End: 2023-08-25

## 2023-08-25 DIAGNOSIS — R26.89 UNSTABLE BALANCE: ICD-10-CM

## 2023-08-25 DIAGNOSIS — R26.9 GAIT DISTURBANCE: Primary | ICD-10-CM

## 2023-08-25 DIAGNOSIS — R13.10 DYSPHAGIA, UNSPECIFIED TYPE: ICD-10-CM

## 2023-08-25 DIAGNOSIS — R49.8 HYPOPHONIA: ICD-10-CM

## 2023-08-25 DIAGNOSIS — G20 PARKINSON'S DISEASE (HCC): ICD-10-CM

## 2023-08-25 NOTE — PROGRESS NOTES
Per Dakota Huertas, patient would like a referral for ST and PT at [facility] with [facility]. Referrals entered per request.

## 2023-08-28 ENCOUNTER — CLINICAL DOCUMENTATION (OUTPATIENT)
Dept: NEUROLOGY | Age: 63
End: 2023-08-28

## 2023-09-19 ENCOUNTER — TELEPHONE (OUTPATIENT)
Dept: NEUROLOGY | Age: 63
End: 2023-09-19

## 2023-09-19 NOTE — TELEPHONE ENCOUNTER
Sia Hernandez from [address] called to ask if Ward Castillo could send another referral over for the patient for PT and OT to include vision and driving.  Contact number for Sia Hernandez is [phone]

## 2023-09-26 DIAGNOSIS — R26.89 ABNORMALITY OF GAIT DUE TO IMPAIRMENT OF BALANCE: ICD-10-CM

## 2023-09-26 DIAGNOSIS — G20.A1 PARKINSON DISEASE: Primary | ICD-10-CM

## 2023-09-26 DIAGNOSIS — Z91.81 AT HIGH RISK FOR FALLS: ICD-10-CM

## 2023-09-28 DIAGNOSIS — F02.B0 MODERATE DEMENTIA DUE TO PARKINSON'S DISEASE, WITHOUT BEHAVIORAL DISTURBANCE, PSYCHOTIC DISTURBANCE, MOOD DISTURBANCE, OR ANXIETY (HCC): ICD-10-CM

## 2023-09-28 DIAGNOSIS — G20 MODERATE DEMENTIA DUE TO PARKINSON'S DISEASE, WITHOUT BEHAVIORAL DISTURBANCE, PSYCHOTIC DISTURBANCE, MOOD DISTURBANCE, OR ANXIETY (HCC): ICD-10-CM

## 2023-10-03 RX ORDER — MEMANTINE HYDROCHLORIDE 10 MG/1
10 TABLET ORAL 2 TIMES DAILY
Qty: 60 TABLET | Refills: 5 | Status: SHIPPED | OUTPATIENT
Start: 2023-10-03

## 2023-10-04 ASSESSMENT — ENCOUNTER SYMPTOMS
VOICE CHANGE: 1
COUGH: 0
TROUBLE SWALLOWING: 1
CONSTIPATION: 0

## 2023-10-04 NOTE — PROGRESS NOTES
for the possibility of DLB, though I have also been informed that genetic testing is also revealed possible GBA mutation (though unable to review results) that may be underlying a strong family history of PD. Additionally this disease course has been complicated by motor fluctuations, levodopa induced dyskinesias, gait and balance disturbance, anxiety, RBD, and PD dementia. Neurologic as noted above. Parkinson's disease: Continue Sinemet  mg tablets 1 tablet 4 times a day. Previously experienced some motor fluctuations with early wearing off in between doses. There has also been the presence of other levodopa induced dyskinesias. Currently stable and well managed. Motor fluctuations: Will continue Gocovri 137 mg capsules. No changes to current regimen. Noticeable improvement in dyskinesias reported. Tolerated without difficulty. Gait/balance disturbance: Will continue Gocoveri 137 mg daily. Has tolerated the medication without difficulty or adverse effects and continues to deny dyskinesias. Currently participating in PT and finding it beneficial.    PD dementia: Will continue memantine 10 mg twice daily. Previous trials of donepezil and the Exelon patch were poorly tolerated. Moving forward a Brain Check could be considered if symptoms worsen or do not improve with quality sleep. Lack of quality sleep could be exacerbating symptoms. Urinary frequency and urgency: Reviewed and discussed a referral to urology for urinary frequency and urgency. Agreed and formal order entered. Continues to contribute to poor quality of sleep-inturrupted sleep pattern. Psychosis: As noted above, likely due to PD dementia. Will continue to monitor for worsening, but not currently a primary concern. Visual hallucinations are not currrently bothersome, frequent, or scary. Reports hallucinations have improved since his last office visit. Anxiety: Continue sertraline 50 mg daily for generalized anxiety.  Continues

## 2023-10-05 ENCOUNTER — OFFICE VISIT (OUTPATIENT)
Dept: NEUROLOGY | Age: 63
End: 2023-10-05
Payer: COMMERCIAL

## 2023-10-05 VITALS
SYSTOLIC BLOOD PRESSURE: 134 MMHG | OXYGEN SATURATION: 98 % | DIASTOLIC BLOOD PRESSURE: 83 MMHG | HEART RATE: 71 BPM | WEIGHT: 222.4 LBS | HEIGHT: 67 IN | BODY MASS INDEX: 34.91 KG/M2

## 2023-10-05 DIAGNOSIS — R39.15 URINARY URGENCY: ICD-10-CM

## 2023-10-05 DIAGNOSIS — G47.52 RBD (REM BEHAVIORAL DISORDER): ICD-10-CM

## 2023-10-05 DIAGNOSIS — G20.A1 PARKINSON'S DISEASE, UNSPECIFIED WHETHER DYSKINESIA PRESENT, UNSPECIFIED WHETHER MANIFESTATIONS FLUCTUATE: Primary | ICD-10-CM

## 2023-10-05 DIAGNOSIS — T42.8X5A MOTOR FLUCTUATIONS RELATED TO MEDICATION USE IN PARKINSON'S DISEASE: ICD-10-CM

## 2023-10-05 DIAGNOSIS — G20 PSYCHOSIS DUE TO PARKINSON'S DISEASE: ICD-10-CM

## 2023-10-05 DIAGNOSIS — F41.9 ANXIETY: ICD-10-CM

## 2023-10-05 DIAGNOSIS — G20 MOTOR FLUCTUATIONS RELATED TO MEDICATION USE IN PARKINSON'S DISEASE: ICD-10-CM

## 2023-10-05 DIAGNOSIS — R35.0 URINARY FREQUENCY: ICD-10-CM

## 2023-10-05 DIAGNOSIS — R49.8 HYPOPHONIA: ICD-10-CM

## 2023-10-05 DIAGNOSIS — R26.89 ABNORMALITY OF GAIT DUE TO IMPAIRMENT OF BALANCE: ICD-10-CM

## 2023-10-05 DIAGNOSIS — R13.10 DYSPHAGIA, UNSPECIFIED TYPE: ICD-10-CM

## 2023-10-05 PROCEDURE — 1123F ACP DISCUSS/DSCN MKR DOCD: CPT

## 2023-10-05 PROCEDURE — 99215 OFFICE O/P EST HI 40 MIN: CPT

## 2023-10-05 PROCEDURE — 99417 PROLNG OP E/M EACH 15 MIN: CPT

## 2023-10-05 RX ORDER — CLONAZEPAM 0.5 MG/1
TABLET ORAL
Qty: 30 TABLET | Refills: 0 | Status: SHIPPED | OUTPATIENT
Start: 2023-10-05 | End: 2023-11-05

## 2023-10-05 ASSESSMENT — PATIENT HEALTH QUESTIONNAIRE - PHQ9
SUM OF ALL RESPONSES TO PHQ QUESTIONS 1-9: 2
SUM OF ALL RESPONSES TO PHQ QUESTIONS 1-9: 2
SUM OF ALL RESPONSES TO PHQ9 QUESTIONS 1 & 2: 2
SUM OF ALL RESPONSES TO PHQ QUESTIONS 1-9: 2
2. FEELING DOWN, DEPRESSED OR HOPELESS: 1
1. LITTLE INTEREST OR PLEASURE IN DOING THINGS: 1
SUM OF ALL RESPONSES TO PHQ QUESTIONS 1-9: 2

## 2023-10-18 ENCOUNTER — OFFICE VISIT (OUTPATIENT)
Dept: UROLOGY | Age: 63
End: 2023-10-18

## 2023-10-18 DIAGNOSIS — N40.1 BENIGN PROSTATIC HYPERPLASIA WITH LOWER URINARY TRACT SYMPTOMS, SYMPTOM DETAILS UNSPECIFIED: Primary | ICD-10-CM

## 2023-10-18 DIAGNOSIS — N40.1 BENIGN PROSTATIC HYPERPLASIA WITH URINARY FREQUENCY: ICD-10-CM

## 2023-10-18 DIAGNOSIS — R35.0 BENIGN PROSTATIC HYPERPLASIA WITH URINARY FREQUENCY: ICD-10-CM

## 2023-10-18 LAB
BILIRUBIN, URINE, POC: NEGATIVE
BLOOD URINE, POC: NEGATIVE
GLUCOSE URINE, POC: NEGATIVE
KETONES, URINE, POC: NEGATIVE
LEUKOCYTE ESTERASE, URINE, POC: NEGATIVE
NITRITE, URINE, POC: NEGATIVE
PH, URINE, POC: 7 (ref 4.6–8)
PROTEIN,URINE, POC: NEGATIVE
PVR, POC: 71 CC
SPECIFIC GRAVITY, URINE, POC: 1.01 (ref 1–1.03)
URINALYSIS CLARITY, POC: NORMAL
URINALYSIS COLOR, POC: NORMAL
UROBILINOGEN, POC: NORMAL

## 2023-10-18 RX ORDER — TAMSULOSIN HYDROCHLORIDE 0.4 MG/1
0.4 CAPSULE ORAL
Qty: 90 CAPSULE | Refills: 3 | Status: SHIPPED | OUTPATIENT
Start: 2023-10-18

## 2023-10-18 ASSESSMENT — ENCOUNTER SYMPTOMS
INDIGESTION: 0
COUGH: 1
BLOOD IN STOOL: 0
CONSTIPATION: 1
HEARTBURN: 0
EYE PAIN: 0
SHORTNESS OF BREATH: 0
BACK PAIN: 1
DIARRHEA: 0
VOMITING: 0
NAUSEA: 0
WHEEZING: 1
SKIN LESIONS: 0
EYE DISCHARGE: 0
ABDOMINAL PAIN: 0

## 2023-10-18 NOTE — PROGRESS NOTES
[facility]  [facility]    [address]  [phone]          Padilla Tarango  : 1960    Chief Complaint   Patient presents with    New Patient    Urinary Urgency    Urinary Frequency          HPI     Padilla Tarango is a 70 y.o. male who is referred to clinic for urinary urgency/frequency. He does have a PMH of Parkinson's disease. He reports urgency/frequency q1h during the day and nocturia 3-4. He denies urinary incontinence. No retention. No hematuria. No UTIs. He is not on any bladder/prostate medications. No history of  surgeries. PSA screening normal per patient in the past.  Due for PSA today. PVR 71 cc    IPSS 17    Lab Results   Component Value Date    PSA 2.2 10/18/2023       Past Medical History:   Diagnosis Date    Arthritis     Kidney stone     Mild neurocognitive disorder     Parkinson's disease      Past Surgical History:   Procedure Laterality Date    VASECTOMY      WISDOM TOOTH EXTRACTION       Current Outpatient Medications   Medication Sig Dispense Refill    tamsulosin (FLOMAX) 0.4 MG capsule Take 1 capsule by mouth nightly 90 capsule 3    clonazePAM (KLONOPIN) 0.5 MG tablet Take 0.5-1 tablet at bedtime. 30 tablet 0    memantine (NAMENDA) 10 MG tablet Take 1 tablet by mouth 2 times daily 60 tablet 5    sertraline (ZOLOFT) 50 MG tablet Take 2 tablets daily. 180 tablet 1    meloxicam (MOBIC) 15 MG tablet Take 1 tablet by mouth daily 30 tablet 5    Amantadine HCl ER (GOCOVRI) 137 MG CP24 Take 137 mg by mouth nightly 60 capsule 11    TRELEGY ELLIPTA 200-62.5-25 MCG/ACT AEPB inhaler INHALE 1 PUFF EVERY DAY BY INHALATION ROUTE AS DIRECTED FOR 30 DAYS. albuterol sulfate HFA (PROVENTIL;VENTOLIN;PROAIR) 108 (90 Base) MCG/ACT inhaler INHALE 2 PUFFS BY MOUTH EVERY 4 HOURS BY INHALATION ROUTE AS NEEDED FOR 30 DAYS.       carbidopa-levodopa (SINEMET)  MG per tablet Take 1 tablet by mouth 4 times daily 360 tablet 3

## 2023-11-14 ENCOUNTER — OFFICE VISIT (OUTPATIENT)
Dept: NEUROLOGY | Age: 63
End: 2023-11-14
Payer: COMMERCIAL

## 2023-11-14 VITALS
SYSTOLIC BLOOD PRESSURE: 128 MMHG | OXYGEN SATURATION: 76 % | BODY MASS INDEX: 35.08 KG/M2 | HEART RATE: 96 BPM | DIASTOLIC BLOOD PRESSURE: 82 MMHG | WEIGHT: 224 LBS

## 2023-11-14 DIAGNOSIS — F02.B0 MODERATE DEMENTIA DUE TO PARKINSON'S DISEASE, WITHOUT BEHAVIORAL DISTURBANCE, PSYCHOTIC DISTURBANCE, MOOD DISTURBANCE, OR ANXIETY (HCC): ICD-10-CM

## 2023-11-14 DIAGNOSIS — G47.52 RBD (REM BEHAVIORAL DISORDER): ICD-10-CM

## 2023-11-14 DIAGNOSIS — G20 PSYCHOSIS DUE TO PARKINSON'S DISEASE: ICD-10-CM

## 2023-11-14 DIAGNOSIS — F41.9 ANXIETY: ICD-10-CM

## 2023-11-14 DIAGNOSIS — G20 MODERATE DEMENTIA DUE TO PARKINSON'S DISEASE, WITHOUT BEHAVIORAL DISTURBANCE, PSYCHOTIC DISTURBANCE, MOOD DISTURBANCE, OR ANXIETY (HCC): ICD-10-CM

## 2023-11-14 DIAGNOSIS — G20.A2 PARKINSON'S DISEASE WITHOUT DYSKINESIA, WITH FLUCTUATING MANIFESTATIONS: Primary | ICD-10-CM

## 2023-11-14 DIAGNOSIS — R26.89 ABNORMALITY OF GAIT DUE TO IMPAIRMENT OF BALANCE: ICD-10-CM

## 2023-11-14 PROCEDURE — 99215 OFFICE O/P EST HI 40 MIN: CPT | Performed by: PSYCHIATRY & NEUROLOGY

## 2023-11-14 PROCEDURE — 1123F ACP DISCUSS/DSCN MKR DOCD: CPT | Performed by: PSYCHIATRY & NEUROLOGY

## 2023-11-14 RX ORDER — SERTRALINE HYDROCHLORIDE 25 MG/1
1 TABLET, FILM COATED ORAL DAILY
COMMUNITY
End: 2023-11-14 | Stop reason: ALTCHOICE

## 2023-11-14 RX ORDER — RIVASTIGMINE 9.5 MG/24H
1 PATCH, EXTENDED RELEASE TRANSDERMAL DAILY
COMMUNITY
End: 2023-11-14

## 2023-11-14 ASSESSMENT — PATIENT HEALTH QUESTIONNAIRE - PHQ9
2. FEELING DOWN, DEPRESSED OR HOPELESS: 1
SUM OF ALL RESPONSES TO PHQ QUESTIONS 1-9: 1
1. LITTLE INTEREST OR PLEASURE IN DOING THINGS: 0
SUM OF ALL RESPONSES TO PHQ QUESTIONS 1-9: 1
SUM OF ALL RESPONSES TO PHQ9 QUESTIONS 1 & 2: 1
SUM OF ALL RESPONSES TO PHQ QUESTIONS 1-9: 1
SUM OF ALL RESPONSES TO PHQ QUESTIONS 1-9: 1

## 2023-11-14 ASSESSMENT — ENCOUNTER SYMPTOMS
CONSTIPATION: 0
COUGH: 0

## 2023-11-14 NOTE — PROGRESS NOTES
Eugenio Warner  [address], [address] [facility], [address]  Phone: [phone] Fax [phone]  Mariza Omer MD      Patient: Wilber Nath  Provider: Mariza Omer MD    CC:   Chief Complaint   Patient presents with    Follow-up     6 month follow up for Parkinson's Disease     Referring Provider:    History of Present Illness:     Wilber Nath is a 70 y.o. RH male who presents for follow-up of Parkinson's disease. He is accompanied for today's visit. He was last seen by LUIS EDUARDO Fiore in October 2023. Patient presents for follow-up and continued management of Parkinson's disease. He has previously been followed by Dr. Clif Rich over the last year and last seen March 2022. These notes have been reviewed. To summarize, he was formally diagnosed with PD in 2021 after approximately 2 years of symptoms including tremor of his L>R hand. Interestingly he has a family history of PD in his father (at the age of 50 years). He was subsequently started on levodopa with a gradual increase in dose over the last year. He was also referred to [address] for further evaluation possible DBS candidacy though he has not been felt to be an adequate DBS candidate based off the presence of early cognitive decline and neuropsychiatric symptoms. Patient be noted that patient has a strong family history of Parkinson's disease with genetic testing revealing a GBA mutation (unable to review actual results). Current medications include:  Sinemet  mg 1 tablets 4 times a day approximately every 4 hours  Memantine 10 mg BID  Sertraline 50 mg daily (reports adverse effects with 50 mg)  Gocovri 137 mg 1 capsule at night    Previous medication trials include: Donepezil (adverse effects), Rytary (high cost), Exelon (adverse effects), clonazepam    Patient presents today for follow-up. Overall he seems to be doing fairly well.     Biggest issue continues to be that of a progressive pattern of cognitive

## 2023-12-12 DIAGNOSIS — G89.29 OTHER CHRONIC PAIN: ICD-10-CM

## 2023-12-12 RX ORDER — MELOXICAM 15 MG/1
15 TABLET ORAL DAILY
Qty: 30 TABLET | Refills: 5 | Status: SHIPPED | OUTPATIENT
Start: 2023-12-12

## 2023-12-12 NOTE — TELEPHONE ENCOUNTER
RX REFILL REQUEST      Nighat Vanegas  1960    **Medication Name:Meloxicam    **Medication Dose:15 mg    **Frequency:1 tab daily    **Preferred Pharmacy Name:[facility]    **Pharmacy Number:    **Last OV:11/14/2023

## 2023-12-30 DIAGNOSIS — F41.9 ANXIETY: ICD-10-CM

## 2024-04-24 DIAGNOSIS — F02.B0 MODERATE DEMENTIA DUE TO PARKINSON'S DISEASE, WITHOUT BEHAVIORAL DISTURBANCE, PSYCHOTIC DISTURBANCE, MOOD DISTURBANCE, OR ANXIETY (HCC): ICD-10-CM

## 2024-04-24 DIAGNOSIS — G20.A1 MODERATE DEMENTIA DUE TO PARKINSON'S DISEASE, WITHOUT BEHAVIORAL DISTURBANCE, PSYCHOTIC DISTURBANCE, MOOD DISTURBANCE, OR ANXIETY (HCC): ICD-10-CM

## 2024-04-24 RX ORDER — MEMANTINE HYDROCHLORIDE 10 MG/1
10 TABLET ORAL 2 TIMES DAILY
Qty: 60 TABLET | Refills: 5 | OUTPATIENT
Start: 2024-04-24

## 2024-05-21 ENCOUNTER — OFFICE VISIT (OUTPATIENT)
Dept: NEUROLOGY | Age: 64
End: 2024-05-21
Payer: MEDICARE

## 2024-05-21 VITALS
DIASTOLIC BLOOD PRESSURE: 78 MMHG | HEART RATE: 81 BPM | BODY MASS INDEX: 35.16 KG/M2 | WEIGHT: 224 LBS | SYSTOLIC BLOOD PRESSURE: 125 MMHG | HEIGHT: 67 IN

## 2024-05-21 DIAGNOSIS — M25.551 BILATERAL HIP PAIN: ICD-10-CM

## 2024-05-21 DIAGNOSIS — G20.A1 MODERATE DEMENTIA DUE TO PARKINSON'S DISEASE, WITHOUT BEHAVIORAL DISTURBANCE, PSYCHOTIC DISTURBANCE, MOOD DISTURBANCE, OR ANXIETY (HCC): ICD-10-CM

## 2024-05-21 DIAGNOSIS — F41.9 ANXIETY: ICD-10-CM

## 2024-05-21 DIAGNOSIS — R26.89 ABNORMALITY OF GAIT DUE TO IMPAIRMENT OF BALANCE: ICD-10-CM

## 2024-05-21 DIAGNOSIS — G20.B2 PARKINSON'S DISEASE WITH DYSKINESIA AND FLUCTUATING MANIFESTATIONS (HCC): Primary | ICD-10-CM

## 2024-05-21 DIAGNOSIS — F02.B0 MODERATE DEMENTIA DUE TO PARKINSON'S DISEASE, WITHOUT BEHAVIORAL DISTURBANCE, PSYCHOTIC DISTURBANCE, MOOD DISTURBANCE, OR ANXIETY (HCC): ICD-10-CM

## 2024-05-21 DIAGNOSIS — M25.552 BILATERAL HIP PAIN: ICD-10-CM

## 2024-05-21 DIAGNOSIS — G47.52 RBD (REM BEHAVIORAL DISORDER): ICD-10-CM

## 2024-05-21 DIAGNOSIS — G89.29 OTHER CHRONIC PAIN: ICD-10-CM

## 2024-05-21 PROCEDURE — 1123F ACP DISCUSS/DSCN MKR DOCD: CPT | Performed by: PSYCHIATRY & NEUROLOGY

## 2024-05-21 PROCEDURE — G8427 DOCREV CUR MEDS BY ELIG CLIN: HCPCS | Performed by: PSYCHIATRY & NEUROLOGY

## 2024-05-21 PROCEDURE — 1036F TOBACCO NON-USER: CPT | Performed by: PSYCHIATRY & NEUROLOGY

## 2024-05-21 PROCEDURE — 99215 OFFICE O/P EST HI 40 MIN: CPT | Performed by: PSYCHIATRY & NEUROLOGY

## 2024-05-21 PROCEDURE — G8417 CALC BMI ABV UP PARAM F/U: HCPCS | Performed by: PSYCHIATRY & NEUROLOGY

## 2024-05-21 PROCEDURE — 3017F COLORECTAL CA SCREEN DOC REV: CPT | Performed by: PSYCHIATRY & NEUROLOGY

## 2024-05-21 RX ORDER — CARBIDOPA/LEVODOPA 25MG-250MG
1 TABLET ORAL 3 TIMES DAILY
Qty: 270 TABLET | Refills: 3 | Status: SHIPPED | OUTPATIENT
Start: 2024-05-21

## 2024-05-21 RX ORDER — MELOXICAM 15 MG/1
15 TABLET ORAL DAILY
Qty: 30 TABLET | Refills: 5 | Status: SHIPPED | OUTPATIENT
Start: 2024-05-21

## 2024-05-21 RX ORDER — MEMANTINE HYDROCHLORIDE 10 MG/1
10 TABLET ORAL 2 TIMES DAILY
Qty: 60 TABLET | Refills: 5 | Status: SHIPPED | OUTPATIENT
Start: 2024-05-21

## 2024-05-21 ASSESSMENT — ENCOUNTER SYMPTOMS
CONSTIPATION: 0
COUGH: 0

## 2024-05-21 NOTE — PROGRESS NOTES
weakness.   Psychiatric/Behavioral:  Positive for confusion (memory loss), decreased concentration, hallucinations and sleep disturbance. The patient is nervous/anxious.        Lab/Imaging Review:   I REVIEWED PERTINENT LABS, IMAGES, AND REPORTS WITH THE PATIENT PERSONALLY, DIRECTLY AND FULLY. THE MOST PERTINENT FINDINGS ARE NOTED BELOW:    DaTscan May 2022:  Impression: Abnormal exam, tracer significantly decreased at both striatum-most prominent posteriorly at the putamen, and also significantly decreased on the RIGHT side at the caudate head. This scan appearance supports a diagnosis of parkinsonian syndrome.    Labs May 2022:  CBC unremarkable. BMP unremarkable with exception of mildly elevated glucose. A1c 5.2%.       Past Medical History:     Past medical history, surgical history, social history, family history, medications, and allergies were reviewed and updated as appropriate.     PAST MEDICAL HISTORY:  Past Medical History:   Diagnosis Date    Arthritis     Kidney stone     Mild neurocognitive disorder     Parkinson's disease (HCC)      PAST SURGICAL HISTORY:   Past Surgical History:   Procedure Laterality Date    VASECTOMY      WISDOM TOOTH EXTRACTION       FAMILY HISTORY:  Family History   Problem Relation Age of Onset    Parkinson's Disease Father     Cancer Mother     Parkinson's Disease Paternal Grandfather       SOCIAL HISTORY:  Social History     Socioeconomic History    Marital status:      Spouse name: None    Number of children: None    Years of education: None    Highest education level: None   Tobacco Use    Smoking status: Never    Smokeless tobacco: Never   Substance and Sexual Activity    Alcohol use: Never       Medications/Allergies:     MEDICATIONS:   Outpatient Encounter Medications as of 5/21/2024   Medication Sig Dispense Refill    carbidopa-levodopa (SINEMET)  MG per tablet Take 1 tablet by mouth 3 times daily 270 tablet 3    meloxicam (MOBIC) 15 MG tablet Take 1 tablet

## 2024-06-03 ENCOUNTER — OFFICE VISIT (OUTPATIENT)
Dept: ORTHOPEDIC SURGERY | Age: 64
End: 2024-06-03
Payer: MEDICARE

## 2024-06-03 DIAGNOSIS — M25.552 BILATERAL HIP PAIN: Primary | ICD-10-CM

## 2024-06-03 DIAGNOSIS — M70.62 TROCHANTERIC BURSITIS OF LEFT HIP: ICD-10-CM

## 2024-06-03 DIAGNOSIS — M25.551 BILATERAL HIP PAIN: Primary | ICD-10-CM

## 2024-06-03 PROCEDURE — G8417 CALC BMI ABV UP PARAM F/U: HCPCS | Performed by: NURSE PRACTITIONER

## 2024-06-03 PROCEDURE — 1036F TOBACCO NON-USER: CPT | Performed by: NURSE PRACTITIONER

## 2024-06-03 PROCEDURE — G8428 CUR MEDS NOT DOCUMENT: HCPCS | Performed by: NURSE PRACTITIONER

## 2024-06-03 PROCEDURE — 1123F ACP DISCUSS/DSCN MKR DOCD: CPT | Performed by: NURSE PRACTITIONER

## 2024-06-03 PROCEDURE — 99203 OFFICE O/P NEW LOW 30 MIN: CPT | Performed by: NURSE PRACTITIONER

## 2024-06-03 PROCEDURE — 20610 DRAIN/INJ JOINT/BURSA W/O US: CPT | Performed by: NURSE PRACTITIONER

## 2024-06-03 PROCEDURE — 3017F COLORECTAL CA SCREEN DOC REV: CPT | Performed by: NURSE PRACTITIONER

## 2024-06-03 RX ORDER — METHYLPREDNISOLONE ACETATE 40 MG/ML
40 INJECTION, SUSPENSION INTRA-ARTICULAR; INTRALESIONAL; INTRAMUSCULAR; SOFT TISSUE ONCE
Status: COMPLETED | OUTPATIENT
Start: 2024-06-03 | End: 2024-06-03

## 2024-06-03 RX ADMIN — METHYLPREDNISOLONE ACETATE 40 MG: 40 INJECTION, SUSPENSION INTRA-ARTICULAR; INTRALESIONAL; INTRAMUSCULAR; SOFT TISSUE at 15:29

## 2024-06-03 NOTE — PROGRESS NOTES
Patient ID:    Johnnie Dobbs  [id number]  63 y.o.  1960    Today: Shavonne 3, 2024      Chief Complaint: Bilateral hip pain        Patient reports longstanding history of bilateral hip pain.  Left side is worse than right side.   The pain is predominately localized to the lateral hip and is characterized as a general ache with occasional sharp pain.  They rate the pain ranging from 3-8 on the pain scale occurring in a cyclical fashion with periods of acute exacerbation. Symptoms are exacerbated with attempting to sleep on the hip, attempting to ascend stairs, and sitting for long periods of time which results in lateral hip pain. Patient denies significant anterior groin pain and denies significant issues with attempting to put on socks and shoes or when attempting to get into or out of a vehicle. No numbness of tingling going down the extremity.  Patient has attempted prior conservative treatment including activity modification.      Past Medical History:  Past Medical History:   Diagnosis Date    Arthritis     Kidney stone     Mild neurocognitive disorder     Parkinson's disease (HCC)        Past Surgical History:  Past Surgical History:   Procedure Laterality Date    VASECTOMY      WISDOM TOOTH EXTRACTION          Medications:     Prior to Admission medications    Medication Sig Start Date End Date Taking? Authorizing Provider   carbidopa-levodopa (SINEMET)  MG per tablet Take 1 tablet by mouth 3 times daily 5/21/24   Ortiz Tamayo MD   meloxicam (MOBIC) 15 MG tablet Take 1 tablet by mouth daily 5/21/24   Ortiz Tamayo MD   memantine (NAMENDA) 10 MG tablet Take 1 tablet by mouth 2 times daily 5/21/24   Ortiz Tamayo MD   sertraline (ZOLOFT) 50 MG tablet TAKE 2 TABLETS BY MOUTH DAILY. 1/4/24   Ivania Lorenz NP-C   tamsulosin (FLOMAX) 0.4 MG capsule Take 1 capsule by mouth nightly 10/18/23   Андрей Rivas MD   Amantadine HCl ER (GOCOVRI) 137 MG CP24 Take 137 mg by mouth nightly

## 2024-06-03 NOTE — PATIENT INSTRUCTIONS
Bursitis: Care Instructions  Overview     A bursa is a small sac of fluid that helps the tissues around a joint slide over one another easily. Injury or overuse of a joint can cause pain, redness, and inflammation in the bursa (bursitis). Bursitis usually gets better if you avoid the activity that caused it. You can help prevent bursitis from coming back by doing stretching and strengthening exercises. You may also need to change the way you do some activities.  Follow-up care is a key part of your treatment and safety. Be sure to make and go to all appointments, and call your doctor if you are having problems. It's also a good idea to know your test results and keep a list of the medicines you take.  How can you care for yourself at home?  Put ice or a cold pack on the area for 10 to 20 minutes at a time. Try to do this every 1 to 2 hours for the next 3 days (when you are awake) or until the swelling goes down. Put a thin cloth between the ice and your skin.  After the 3 days of using ice, you may use heat on the area. You can use a hot water bottle; a warm, moist towel; or a heating pad set on low. You can also try alternating heat and ice.  Rest the area where you have pain. Stop any activities that cause pain. Switch to activities that do not stress the area.  Take pain medicines exactly as directed.  If the doctor gave you a prescription medicine for pain, take it as prescribed.  If you are not taking a prescription pain medicine, ask your doctor if you can take an over-the-counter medicine.  Do not take two or more pain medicines at the same time unless the doctor told you to. Many pain medicines have acetaminophen, which is Tylenol. Too much acetaminophen (Tylenol) can be harmful.  To prevent stiffness, gently move the joint as much as you can without pain every day. As the pain gets better, keep doing range-of-motion exercises. Ask your doctor for exercises that will make the muscles around the joint

## 2024-06-19 DIAGNOSIS — F41.9 ANXIETY: ICD-10-CM

## 2024-07-22 DIAGNOSIS — N40.1 BENIGN PROSTATIC HYPERPLASIA WITH LOWER URINARY TRACT SYMPTOMS, SYMPTOM DETAILS UNSPECIFIED: ICD-10-CM

## 2024-07-22 RX ORDER — TAMSULOSIN HYDROCHLORIDE 0.4 MG/1
0.4 CAPSULE ORAL
Qty: 90 CAPSULE | Refills: 3 | Status: SHIPPED | OUTPATIENT
Start: 2024-07-22

## 2024-10-31 ASSESSMENT — ENCOUNTER SYMPTOMS
CONSTIPATION: 0
COUGH: 0

## 2024-10-31 NOTE — PROGRESS NOTES
[facility] NEUROLOGY  [address], [address]  [place] [address]  Phone: [phone] Fax [phone]  Ortiz Tamayo MD      Patient: Johnnie Dobbs  Provider: Ortiz Tamayo MD    CC:   Chief Complaint   Patient presents with    Follow-up    Neurologic Problem     Referring Provider:    History of Present Illness:     Johnnie Dobbs is a 64 y.o. RH male who presents for follow-up of Parkinson's disease.     He is accompanied for today's visit.  He was last seen May 2024.     Patient presents for follow-up and continued management of Parkinson's disease, diagnosed in 2021 after approximately 2 years of symptoms including tremor of the L>R hands.  He has a family history of PD in his father (onset late 40s) and genetic testing has revealed the possibility of a GBA mutation (though unable to review the results).  Given evidence of early cognitive and psychiatric impairment, the possibility of Lewy body dementia has also been discussed.    Current medications include:  Sinemet  mg 1 tablets 3 times a day (8 AM, 12 PM, and 8 PM)  Memantine 10 mg BID  Sertraline 50 mg BID (reports adverse effects with 100 mg)  Gocovri 137 mg 1 capsule at night    Previous medication trials include: Donepezil (adverse effects), Rytary (high cost), Exelon (adverse effects), clonazepam    Patient presents today for follow-up.      Overall there have been no significant changes.    Persistent cognitive deficits, word finding difficulty which has been a source of frustration for him.  He has other short-term memory lapses and has difficulty with arithmetic and functional impairment with relatively simple tasks.  He has worked with some speech therapy in the past locally in [place].    Unable to tolerate cholinesterase inhibitors, but does remain on memantine.  Continue to monitor for visual hallucinations which have been occurring more frequently.  He does continue to drive locally but we have discussed safety

## 2024-11-01 ENCOUNTER — OFFICE VISIT (OUTPATIENT)
Dept: NEUROLOGY | Age: 64
End: 2024-11-01

## 2024-11-01 VITALS
WEIGHT: 227 LBS | DIASTOLIC BLOOD PRESSURE: 81 MMHG | HEIGHT: 67 IN | SYSTOLIC BLOOD PRESSURE: 136 MMHG | BODY MASS INDEX: 35.63 KG/M2 | HEART RATE: 76 BPM

## 2024-11-01 DIAGNOSIS — G89.29 OTHER CHRONIC PAIN: ICD-10-CM

## 2024-11-01 DIAGNOSIS — G20.A1 MODERATE DEMENTIA DUE TO PARKINSON'S DISEASE, WITHOUT BEHAVIORAL DISTURBANCE, PSYCHOTIC DISTURBANCE, MOOD DISTURBANCE, OR ANXIETY (HCC): ICD-10-CM

## 2024-11-01 DIAGNOSIS — F41.9 ANXIETY: ICD-10-CM

## 2024-11-01 DIAGNOSIS — G20.B2 PARKINSON'S DISEASE WITH DYSKINESIA AND FLUCTUATING MANIFESTATIONS (HCC): Primary | ICD-10-CM

## 2024-11-01 DIAGNOSIS — R26.89 ABNORMALITY OF GAIT DUE TO IMPAIRMENT OF BALANCE: ICD-10-CM

## 2024-11-01 DIAGNOSIS — M79.2 NEUROPATHIC PAIN: ICD-10-CM

## 2024-11-01 DIAGNOSIS — F02.B0 MODERATE DEMENTIA DUE TO PARKINSON'S DISEASE, WITHOUT BEHAVIORAL DISTURBANCE, PSYCHOTIC DISTURBANCE, MOOD DISTURBANCE, OR ANXIETY (HCC): ICD-10-CM

## 2024-11-01 RX ORDER — MEMANTINE HYDROCHLORIDE 10 MG/1
10 TABLET ORAL 2 TIMES DAILY
Qty: 180 TABLET | Refills: 3 | Status: SHIPPED | OUTPATIENT
Start: 2024-11-01

## 2024-11-01 RX ORDER — MELOXICAM 15 MG/1
15 TABLET ORAL DAILY
Qty: 90 TABLET | Refills: 3 | Status: SHIPPED | OUTPATIENT
Start: 2024-11-01

## 2024-11-01 RX ORDER — GABAPENTIN 100 MG/1
100 CAPSULE ORAL 3 TIMES DAILY PRN
Qty: 90 CAPSULE | Refills: 5 | Status: SHIPPED | OUTPATIENT
Start: 2024-11-01 | End: 2025-04-30

## 2025-01-14 ENCOUNTER — APPOINTMENT (OUTPATIENT)
Dept: URBAN - NONMETROPOLITAN AREA CLINIC 1 | Facility: CLINIC | Age: 65
Setting detail: DERMATOLOGY
End: 2025-01-14

## 2025-01-14 DIAGNOSIS — L82.0 INFLAMED SEBORRHEIC KERATOSIS: ICD-10-CM

## 2025-01-14 DIAGNOSIS — L82.1 OTHER SEBORRHEIC KERATOSIS: ICD-10-CM

## 2025-01-14 DIAGNOSIS — D485 NEOPLASM OF UNCERTAIN BEHAVIOR OF SKIN: ICD-10-CM

## 2025-01-14 PROBLEM — D48.5 NEOPLASM OF UNCERTAIN BEHAVIOR OF SKIN: Status: ACTIVE | Noted: 2025-01-14

## 2025-01-14 PROCEDURE — ? COUNSELING

## 2025-01-14 PROCEDURE — ? LIQUID NITROGEN

## 2025-01-14 PROCEDURE — 17110 DESTRUCTION B9 LES UP TO 14: CPT

## 2025-01-14 PROCEDURE — 99212 OFFICE O/P EST SF 10 MIN: CPT | Mod: 25

## 2025-01-14 PROCEDURE — ? DEFER

## 2025-01-14 ASSESSMENT — LOCATION ZONE DERM
LOCATION ZONE: LEG
LOCATION ZONE: ARM
LOCATION ZONE: TRUNK

## 2025-01-14 ASSESSMENT — LOCATION SIMPLE DESCRIPTION DERM
LOCATION SIMPLE: LEFT UPPER BACK
LOCATION SIMPLE: RIGHT THIGH
LOCATION SIMPLE: LEFT WRIST
LOCATION SIMPLE: LEFT LOWER BACK

## 2025-01-14 ASSESSMENT — LOCATION DETAILED DESCRIPTION DERM
LOCATION DETAILED: RIGHT ANTERIOR LATERAL PROXIMAL THIGH
LOCATION DETAILED: LEFT DORSAL WRIST
LOCATION DETAILED: LEFT MEDIAL UPPER BACK
LOCATION DETAILED: LEFT INFERIOR LATERAL MIDBACK

## 2025-01-14 NOTE — PROCEDURE: LIQUID NITROGEN
Add 52 Modifier (Optional): no
Consent: The patient's consent was obtained including but not limited to risks of crusting, scabbing, blistering, scarring, darker or lighter pigmentary change, recurrence, incomplete removal and infection.
Show Topical Anesthesia Variable?: Yes
Detail Level: Detailed
Number Of Freeze-Thaw Cycles: 2 freeze-thaw cycles
Medical Necessity Clause: This procedure was medically necessary because the lesions that were treated were:
Medical Necessity Information: It is in your best interest to select a reason for this procedure from the list below. All of these items fulfill various CMS LCD requirements except the new and changing color options.
Post-Care Instructions: I reviewed with the patient in detail post-care instructions. Patient is to wear sunprotection, and avoid picking at any of the treated lesions. Pt may apply Vaseline to crusted or scabbing areas.
Spray Paint Text: The liquid nitrogen was applied to the skin utilizing a spray paint frosting technique.

## 2025-05-04 NOTE — PROGRESS NOTES
[facility] NEUROLOGY  [address], [address]  [place] [address]  Phone: [phone] Fax [phone]  Ortiz Tamayo MD      Patient: Johnnie Dobbs  Provider: Ortiz Tamayo MD    CC:   Chief Complaint   Patient presents with    Follow-up     Parkinson's disease     Referring Provider:    History of Present Illness:     Johnnie Dobbs is a 64 y.o. RH male who presents for follow-up of Parkinson's disease.     He is accompanied for today's visit.  He was last seen November 2024.     Patient presents for follow-up and continued management of Parkinson's disease, diagnosed in 2021 after approximately 2 years of symptoms including tremor of the L>R hands.      He has a family history of PD in his father (onset late 40s) and genetic testing has revealed the possibility of a GBA mutation (though unable to review the results).      Given evidence of early cognitive and psychiatric impairment, the possibility of Lewy body dementia has also been discussed.    Current medications include:  Sinemet  mg 1 tablets 3 times a day (8 AM, 12 PM, and 8 PM)  Memantine 10 mg BID  Sertraline 50 mg BID (reports adverse effects with 100 mg)  Gabapentin 300 mg at night    Previous medication trials include: Donepezil (adverse effects), Rytary (high cost), Exelon (adverse effects), clonazepam    Patient presents today for follow-up.      Chart review reveals no significant changes, but spouse notes interval decline since his last visit. Increased changes over the last few days prior to visit.     Persistent cognitive deficits, word finding difficulty which has been a source of frustration for him.  He has other short-term memory lapses and has difficulty with arithmetic and functional impairment with relatively simple tasks.  Visual hallucinations have been occurring more frequently.      Unable to tolerate cholinesterase inhibitors, but does remain on memantine.  He has worked with some speech therapy in the past locally

## 2025-05-05 ENCOUNTER — OFFICE VISIT (OUTPATIENT)
Dept: NEUROLOGY | Age: 65
End: 2025-05-05
Payer: MEDICARE

## 2025-05-05 VITALS
BODY MASS INDEX: 36.26 KG/M2 | HEIGHT: 67 IN | DIASTOLIC BLOOD PRESSURE: 70 MMHG | SYSTOLIC BLOOD PRESSURE: 116 MMHG | WEIGHT: 231 LBS

## 2025-05-05 DIAGNOSIS — G20.A1 MODERATE DEMENTIA DUE TO PARKINSON'S DISEASE, WITHOUT BEHAVIORAL DISTURBANCE, PSYCHOTIC DISTURBANCE, MOOD DISTURBANCE, OR ANXIETY (HCC): ICD-10-CM

## 2025-05-05 DIAGNOSIS — F51.05 INSOMNIA SECONDARY TO ANXIETY: ICD-10-CM

## 2025-05-05 DIAGNOSIS — M79.2 NEUROPATHIC PAIN: ICD-10-CM

## 2025-05-05 DIAGNOSIS — F02.B0 MODERATE DEMENTIA DUE TO PARKINSON'S DISEASE, WITHOUT BEHAVIORAL DISTURBANCE, PSYCHOTIC DISTURBANCE, MOOD DISTURBANCE, OR ANXIETY (HCC): ICD-10-CM

## 2025-05-05 DIAGNOSIS — R53.82 CHRONIC FATIGUE, UNSPECIFIED: ICD-10-CM

## 2025-05-05 DIAGNOSIS — F41.9 INSOMNIA SECONDARY TO ANXIETY: ICD-10-CM

## 2025-05-05 DIAGNOSIS — G20.B2 PARKINSON'S DISEASE WITH DYSKINESIA AND FLUCTUATING MANIFESTATIONS (HCC): Primary | ICD-10-CM

## 2025-05-05 DIAGNOSIS — G20.B2 PARKINSON'S DISEASE WITH DYSKINESIA AND FLUCTUATING MANIFESTATIONS (HCC): ICD-10-CM

## 2025-05-05 DIAGNOSIS — R26.89 ABNORMALITY OF GAIT DUE TO IMPAIRMENT OF BALANCE: ICD-10-CM

## 2025-05-05 LAB
ALBUMIN SERPL-MCNC: 3.5 G/DL (ref 3.2–4.6)
ALBUMIN/GLOB SERPL: 1.2 (ref 1–1.9)
ALP SERPL-CCNC: 71 U/L (ref 40–129)
ALT SERPL-CCNC: 12 U/L (ref 8–55)
ANION GAP SERPL CALC-SCNC: 9 MMOL/L (ref 7–16)
APPEARANCE UR: CLEAR
AST SERPL-CCNC: 43 U/L (ref 15–37)
BACTERIA URNS QL MICRO: 0 /HPF
BASOPHILS # BLD: 0.14 K/UL (ref 0–0.2)
BASOPHILS NFR BLD: 1.5 % (ref 0–2)
BILIRUB SERPL-MCNC: 0.5 MG/DL (ref 0–1.2)
BILIRUB UR QL: NEGATIVE
BUN SERPL-MCNC: 24 MG/DL (ref 8–23)
CALCIUM SERPL-MCNC: 9.1 MG/DL (ref 8.8–10.2)
CASTS URNS QL MICRO: 0 /LPF
CHLORIDE SERPL-SCNC: 104 MMOL/L (ref 98–107)
CO2 SERPL-SCNC: 27 MMOL/L (ref 20–29)
COLOR UR: YELLOW
CREAT SERPL-MCNC: 1.33 MG/DL (ref 0.8–1.3)
CRYSTALS URNS QL MICRO: 0 /LPF
DIFFERENTIAL METHOD BLD: ABNORMAL
EOSINOPHIL # BLD: 0.09 K/UL (ref 0–0.8)
EOSINOPHIL NFR BLD: 0.9 % (ref 0.5–7.8)
EPI CELLS #/AREA URNS HPF: 0 /HPF
ERYTHROCYTE [DISTWIDTH] IN BLOOD BY AUTOMATED COUNT: 15.8 % (ref 11.9–14.6)
ERYTHROCYTE [SEDIMENTATION RATE] IN BLOOD: 4 MM/HR
GLOBULIN SER CALC-MCNC: 3 G/DL (ref 2.3–3.5)
GLUCOSE SERPL-MCNC: 95 MG/DL (ref 70–99)
GLUCOSE UR STRIP.AUTO-MCNC: NEGATIVE MG/DL
HCT VFR BLD AUTO: 41.6 % (ref 41.1–50.3)
HGB BLD-MCNC: 13.9 G/DL (ref 13.6–17.2)
HGB UR QL STRIP: NEGATIVE
IMM GRANULOCYTES # BLD AUTO: 0.07 K/UL (ref 0–0.5)
IMM GRANULOCYTES NFR BLD AUTO: 0.7 % (ref 0–5)
KETONES UR QL STRIP.AUTO: NEGATIVE MG/DL
LEUKOCYTE ESTERASE UR QL STRIP.AUTO: NEGATIVE
LYMPHOCYTES # BLD: 1.34 K/UL (ref 0.5–4.6)
LYMPHOCYTES NFR BLD: 13.9 % (ref 13–44)
MAGNESIUM SERPL-MCNC: 2.3 MG/DL (ref 1.8–2.4)
MCH RBC QN AUTO: 30.9 PG (ref 26.1–32.9)
MCHC RBC AUTO-ENTMCNC: 33.4 G/DL (ref 31.4–35)
MCV RBC AUTO: 92.4 FL (ref 82–102)
MONOCYTES # BLD: 0.94 K/UL (ref 0.1–1.3)
MONOCYTES NFR BLD: 9.8 % (ref 4–12)
MUCOUS THREADS URNS QL MICRO: 0 /LPF
NEUTS SEG # BLD: 7.05 K/UL (ref 1.7–8.2)
NEUTS SEG NFR BLD: 73.2 % (ref 43–78)
NITRITE UR QL STRIP.AUTO: NEGATIVE
NRBC # BLD: 0 K/UL (ref 0–0.2)
PH UR STRIP: 6 (ref 5–9)
PLATELET # BLD AUTO: 203 K/UL (ref 150–450)
PMV BLD AUTO: 9.7 FL (ref 9.4–12.3)
POTASSIUM SERPL-SCNC: 4.5 MMOL/L (ref 3.5–5.1)
PROT SERPL-MCNC: 6.5 G/DL (ref 6.3–8.2)
PROT UR STRIP-MCNC: NEGATIVE MG/DL
RBC # BLD AUTO: 4.5 M/UL (ref 4.23–5.6)
RBC #/AREA URNS HPF: 0 /HPF
SODIUM SERPL-SCNC: 140 MMOL/L (ref 136–145)
SP GR UR REFRACTOMETRY: 1.02 (ref 1–1.02)
URINE CULTURE IF INDICATED: ABNORMAL
UROBILINOGEN UR QL STRIP.AUTO: 0.2 EU/DL (ref 0.2–1)
WBC # BLD AUTO: 9.6 K/UL (ref 4.3–11.1)
WBC URNS QL MICRO: 0 /HPF

## 2025-05-05 PROCEDURE — 1036F TOBACCO NON-USER: CPT | Performed by: PSYCHIATRY & NEUROLOGY

## 2025-05-05 PROCEDURE — 1123F ACP DISCUSS/DSCN MKR DOCD: CPT | Performed by: PSYCHIATRY & NEUROLOGY

## 2025-05-05 PROCEDURE — G8417 CALC BMI ABV UP PARAM F/U: HCPCS | Performed by: PSYCHIATRY & NEUROLOGY

## 2025-05-05 PROCEDURE — 3017F COLORECTAL CA SCREEN DOC REV: CPT | Performed by: PSYCHIATRY & NEUROLOGY

## 2025-05-05 PROCEDURE — 1159F MED LIST DOCD IN RCRD: CPT | Performed by: PSYCHIATRY & NEUROLOGY

## 2025-05-05 PROCEDURE — G8427 DOCREV CUR MEDS BY ELIG CLIN: HCPCS | Performed by: PSYCHIATRY & NEUROLOGY

## 2025-05-05 PROCEDURE — 99215 OFFICE O/P EST HI 40 MIN: CPT | Performed by: PSYCHIATRY & NEUROLOGY

## 2025-05-05 RX ORDER — QUETIAPINE FUMARATE 25 MG/1
25-50 TABLET, FILM COATED ORAL
Qty: 60 TABLET | Refills: 2 | Status: SHIPPED | OUTPATIENT
Start: 2025-05-05

## 2025-05-05 NOTE — PATIENT INSTRUCTIONS
Blood work today (urine too). You can get this done on the first floor (right off the elevator and down the hallway on the right).     Start quetiapine 25 mg tablets.  Start with 1 tablet at bedtime.  Can increase to 2 tablets if needed.  Watch out for any over drowsiness.  Okay to take it with the gabapentin.    
cheyenne